# Patient Record
Sex: MALE | Race: WHITE | NOT HISPANIC OR LATINO | URBAN - METROPOLITAN AREA
[De-identification: names, ages, dates, MRNs, and addresses within clinical notes are randomized per-mention and may not be internally consistent; named-entity substitution may affect disease eponyms.]

---

## 2020-01-30 ENCOUNTER — EMERGENCY (EMERGENCY)
Facility: HOSPITAL | Age: 60
LOS: 1 days | Discharge: HOME | End: 2020-01-30
Attending: EMERGENCY MEDICINE
Payer: COMMERCIAL

## 2020-01-30 VITALS
TEMPERATURE: 98 F | SYSTOLIC BLOOD PRESSURE: 164 MMHG | RESPIRATION RATE: 20 BRPM | WEIGHT: 166.01 LBS | HEIGHT: 66 IN | DIASTOLIC BLOOD PRESSURE: 94 MMHG | OXYGEN SATURATION: 99 % | HEART RATE: 127 BPM

## 2020-01-30 PROCEDURE — 99283 EMERGENCY DEPT VISIT LOW MDM: CPT

## 2020-01-31 VITALS
SYSTOLIC BLOOD PRESSURE: 120 MMHG | RESPIRATION RATE: 18 BRPM | DIASTOLIC BLOOD PRESSURE: 65 MMHG | TEMPERATURE: 98 F | HEART RATE: 87 BPM | OXYGEN SATURATION: 98 %

## 2020-01-31 LAB
APPEARANCE UR: CLEAR — SIGNIFICANT CHANGE UP
BACTERIA # UR AUTO: ABNORMAL /HPF
BILIRUB UR-MCNC: NEGATIVE — SIGNIFICANT CHANGE UP
COLOR SPEC: ABNORMAL
DIFF PNL FLD: ABNORMAL
EPI CELLS # UR: SIGNIFICANT CHANGE UP /HPF
GLUCOSE UR QL: NEGATIVE — SIGNIFICANT CHANGE UP
HYALINE CASTS # UR AUTO: ABNORMAL /LPF
KETONES UR-MCNC: NEGATIVE — SIGNIFICANT CHANGE UP
LEUKOCYTE ESTERASE UR-ACNC: ABNORMAL
NITRITE UR-MCNC: NEGATIVE — SIGNIFICANT CHANGE UP
PH UR: 7 — SIGNIFICANT CHANGE UP (ref 5–8)
PROT UR-MCNC: 100
RBC CASTS # UR COMP ASSIST: ABNORMAL /HPF (ref 0–2)
SP GR SPEC: 1 — LOW (ref 1.01–1.02)
UROBILINOGEN FLD QL: NEGATIVE — SIGNIFICANT CHANGE UP
WBC UR QL: SIGNIFICANT CHANGE UP /HPF (ref 0–5)

## 2020-01-31 PROCEDURE — 81001 URINALYSIS AUTO W/SCOPE: CPT

## 2020-01-31 PROCEDURE — 99283 EMERGENCY DEPT VISIT LOW MDM: CPT | Mod: 25

## 2020-01-31 PROCEDURE — 51702 INSERT TEMP BLADDER CATH: CPT

## 2020-01-31 PROCEDURE — 87086 URINE CULTURE/COLONY COUNT: CPT

## 2020-01-31 RX ORDER — TAMSULOSIN HYDROCHLORIDE 0.4 MG/1
1 CAPSULE ORAL
Qty: 14 | Refills: 0
Start: 2020-01-31 | End: 2020-02-13

## 2020-01-31 NOTE — ED ADULT NURSE NOTE - NSIMPLEMENTINTERV_GEN_ALL_ED
Implemented All Universal Safety Interventions:  [place] to call system. Call bell, personal items and telephone within reach. Instruct patient to call for assistance. Room bathroom lighting operational. Non-slip footwear when patient is off stretcher. Physically safe environment: no spills, clutter or unnecessary equipment. Stretcher in lowest position, wheels locked, appropriate side rails in place.

## 2020-01-31 NOTE — ED PROVIDER NOTE - PHYSICAL EXAMINATION
GENERAL: well appearing, uncomfortable standing up next to stretcher    HEAD: atraumatic   EYES: EOMI, pink conjunctiva   ENT: moist oral mucosa   CARDIAC: RRR, no edema, distal pulses present   RESPIRATORY: lungs CTAB, no increased work of breathing   GASTROINTESTINAL: +lower abd fullness; no abdominal tenderness, no rebound or guarding, bowel sounds presents  GENITOURINARY: no CVA tenderness   MUSCULOSKELETAL: no deformity   NEUROLOGICAL: AAOx3, spontaneous movement of extremities   SKIN: intact   PSYCHIATRIC: cooperative  HEME LYMPH: no lymphadenopathy

## 2020-01-31 NOTE — ED PROVIDER NOTE - CLINICAL SUMMARY MEDICAL DECISION MAKING FREE TEXT BOX
60 yo M with urinary retention. Hui cath placed by RN, not by me. Symptoms resolved. Will dc with rx for flomax and pt has private urology fu. Discussed indications for patient return to ED. Patient understood.

## 2020-01-31 NOTE — ED PROVIDER NOTE - NS ED ROS FT
CONSTITUTIONAL: no fever, no chills   EYES: no visual changes, no eye pain   ENMT: no nasal congestion, no throat pain  CARDIOVASCULAR: no chest pain, no edema, no palpitations   RESPIRATORY: no shortness of breath, no cough   GASTROINTESTINAL: no abdominal pain, no nausea, no vomiting, no diarrhea, no constipation   GENITOURINARY: no dysuria, no frequency, +retention   MUSCULOSKELETAL: no joint pains, no myalgias, no back pain   SKIN: no rashes  NEUROLOGICAL: no weakness, no headache, no dizziness, no slurred speech, no syncope   PSYCHIATRIC: no known mental health illness   HEME/LYMPH: no lymphadenopathy      All other ROS negative except as per HPI

## 2020-01-31 NOTE — ED PROVIDER NOTE - NSFOLLOWUPCLINICS_GEN_ALL_ED_FT
[facility]  Urology  [address] [place] [address]  Phone: [phone]  Fax: [phone]  Follow Up Time:

## 2020-01-31 NOTE — ED PROVIDER NOTE - NSFOLLOWUPINSTRUCTIONS_ED_ALL_ED_FT
Acute Urinary Retention, Male     Acute urinary retention is a condition in which a person is unable to pass urine. This can last for a short time or for a long time. If left untreated, it can result in kidney damage or other serious complications.  What are the causes?  This condition may be caused by:  Obstruction or narrowing of the tube that drains the bladder (urethra). This may be caused by surgery or problems with nearby organs, such as the prostate gland, which can press or squeeze the urethra.Problems with the nerves in the bladder. These can be caused by diseases, such as multiple sclerosis, or by spinal cord injuries.Certain medicines.Tumors in the area of the pelvis, bladder, or urethra.Diabetes.Degenerative cognitive conditions such as delirium or dementia.Bladder or urinary tract infection.Constipation.Blood in the urine (hematuria).Injury to the bladder or urethra. Psychological (psychogenic) conditions. Someone may hold his urine due to trauma or because he does not want to use the bathroom.What increases the risk?  This condition is more likely to develop in older men. As men age, their prostate may become larger and may start pressing or squeezing on the bladder or the urethra.  What are the signs or symptoms?  Symptoms of this condition include:  Trouble urinating.Pain in the lower abdomen.Symptoms usually come on slowly over a long period of time.  How is this diagnosed?  This condition is diagnosed based on a physical exam and a medical history. You may also have other tests, including:  An ultrasound of the bladder or kidneys or both.Blood tests.A urine analysis.Additional tests may be needed such as an MRI, kidney, or bladder function tests.How is this treated?  Treatment for this condition may include:  Medicines.Placing a thin, sterile tube (catheter) into the bladder to drain urine out of the body. This is called an indwelling urinary catheter. After being inserted, the catheter is held in place with a small balloon that is filled with sterile water. Urine drains from the catheter into a collection bag outside of the body.Behavioral therapy.Treatment for any underlying conditions.If needed, you may be treated in the hospital for kidney function problems or to manage other complications.Follow these instructions at home:  Take over-the-counter and prescription medicines only as told by your health care provider. Avoid certain medicines, such as decongestants, antihistamines, and some prescription medicines. Do not take any medicine unless your health care provider has approved.If you were given an indwelling urinary catheter, take care of it as told by your health care provider.Drink enough fluid to keep your urine clear or pale yellow.If you were prescribed an antibiotic, take it as told by your health care provider. Do not stop taking the antibiotic even if you start to feel better.Do not use any products that contain nicotine or tobacco, such as cigarettes and e-cigarettes. If you need help quitting, ask your health care provider.Monitor any changes in your symptoms. Tell your health care provider about any changes.If instructed, monitor your blood pressure at home. Report changes as told by your health care provider.Keep all follow-up visits as told by your health care provider. This is important.Contact a health care provider if:  You have uncomfortable bladder contractions that you cannot control (spasms) or you leak urine with the spasms.Get help right away if:  You have chills or fever.You have blood in your urine.You have a catheter and:  Your catheter stops draining urine.Your catheter falls out.Summary  Acute urinary retention is a condition in which a person is unable to pass urine. If left untreated, it can result in kidney damage or other serious complications.The cause of this condition may include an enlarged prostate. As men age, their prostate gland may become larger and may start pressing or squeezing on the bladder or the urethra.Treatment for this condition may include medicines and placement of an indwelling urinary catheter.Monitor any changes in your symptoms. Tell your health care provider about any changes.This information is not intended to replace advice given to you by your health care provider. Make sure you discuss any questions you have with your health care provider.    Document Released: 03/26/2002 Document Revised: 01/19/2018 Document Reviewed: 01/19/2018  [employer] Interactive Patient Education © 2019 [employer]

## 2020-01-31 NOTE — ED ADULT NURSE NOTE - OBJECTIVE STATEMENT
Pt presented to the ed with c/o urinary rentention.  Pt stated that he could not urinate and felt discomfort but not pain.

## 2020-01-31 NOTE — ED PROVIDER NOTE - PATIENT PORTAL LINK FT
You can access the FollowMyHealth Patient Portal offered by [facility] by registering at the following website: [email or web address]. By joining [employer]’s FollowMyHealth portal, you will also be able to view your health information using other applications (apps) compatible with our system.

## 2020-02-01 LAB
CULTURE RESULTS: NO GROWTH — SIGNIFICANT CHANGE UP
SPECIMEN SOURCE: SIGNIFICANT CHANGE UP

## 2020-02-06 ENCOUNTER — APPOINTMENT (OUTPATIENT)
Dept: CT IMAGING | Facility: CLINIC | Age: 60
End: 2020-02-06
Payer: COMMERCIAL

## 2020-02-06 ENCOUNTER — OUTPATIENT (OUTPATIENT)
Dept: OUTPATIENT SERVICES | Facility: HOSPITAL | Age: 60
LOS: 1 days | End: 2020-02-06
Payer: COMMERCIAL

## 2020-02-06 DIAGNOSIS — Z00.8 ENCOUNTER FOR OTHER GENERAL EXAMINATION: ICD-10-CM

## 2020-02-06 PROCEDURE — 74176 CT ABD & PELVIS W/O CONTRAST: CPT

## 2020-02-06 PROCEDURE — 74176 CT ABD & PELVIS W/O CONTRAST: CPT | Mod: 26

## 2020-02-21 ENCOUNTER — EMERGENCY (EMERGENCY)
Facility: HOSPITAL | Age: 60
LOS: 1 days | Discharge: HOME | End: 2020-02-21
Attending: EMERGENCY MEDICINE
Payer: COMMERCIAL

## 2020-02-21 VITALS
TEMPERATURE: 98 F | DIASTOLIC BLOOD PRESSURE: 79 MMHG | HEART RATE: 58 BPM | RESPIRATION RATE: 18 BRPM | SYSTOLIC BLOOD PRESSURE: 169 MMHG | OXYGEN SATURATION: 97 %

## 2020-02-21 VITALS
OXYGEN SATURATION: 97 % | DIASTOLIC BLOOD PRESSURE: 83 MMHG | HEART RATE: 92 BPM | RESPIRATION RATE: 19 BRPM | TEMPERATURE: 98 F | SYSTOLIC BLOOD PRESSURE: 155 MMHG | WEIGHT: 160.06 LBS | HEIGHT: 66 IN

## 2020-02-21 LAB
ALBUMIN SERPL ELPH-MCNC: 5 G/DL — SIGNIFICANT CHANGE UP (ref 3.3–5)
ALP SERPL-CCNC: 88 U/L — SIGNIFICANT CHANGE UP (ref 40–120)
ALT FLD-CCNC: 36 U/L — SIGNIFICANT CHANGE UP (ref 10–45)
ANION GAP SERPL CALC-SCNC: 17 MMOL/L — SIGNIFICANT CHANGE UP (ref 5–17)
APPEARANCE UR: CLEAR — SIGNIFICANT CHANGE UP
AST SERPL-CCNC: 27 U/L — SIGNIFICANT CHANGE UP (ref 10–40)
BASOPHILS # BLD AUTO: 0.03 K/UL — SIGNIFICANT CHANGE UP (ref 0–0.2)
BASOPHILS NFR BLD AUTO: 0.4 % — SIGNIFICANT CHANGE UP (ref 0–2)
BILIRUB SERPL-MCNC: 0.6 MG/DL — SIGNIFICANT CHANGE UP (ref 0.2–1.2)
BILIRUB UR-MCNC: NEGATIVE — SIGNIFICANT CHANGE UP
BUN SERPL-MCNC: 19 MG/DL — SIGNIFICANT CHANGE UP (ref 7–23)
CALCIUM SERPL-MCNC: 9.5 MG/DL — SIGNIFICANT CHANGE UP (ref 8.4–10.5)
CHLORIDE SERPL-SCNC: 103 MMOL/L — SIGNIFICANT CHANGE UP (ref 96–108)
CO2 SERPL-SCNC: 21 MMOL/L — LOW (ref 22–31)
COLOR SPEC: YELLOW — SIGNIFICANT CHANGE UP
CREAT SERPL-MCNC: 0.67 MG/DL — SIGNIFICANT CHANGE UP (ref 0.5–1.3)
DIFF PNL FLD: ABNORMAL
EOSINOPHIL # BLD AUTO: 0.1 K/UL — SIGNIFICANT CHANGE UP (ref 0–0.5)
EOSINOPHIL NFR BLD AUTO: 1.2 % — SIGNIFICANT CHANGE UP (ref 0–6)
GLUCOSE SERPL-MCNC: 104 MG/DL — HIGH (ref 70–99)
GLUCOSE UR QL: NEGATIVE — SIGNIFICANT CHANGE UP
HCT VFR BLD CALC: 47.9 % — SIGNIFICANT CHANGE UP (ref 39–50)
HGB BLD-MCNC: 15.7 G/DL — SIGNIFICANT CHANGE UP (ref 13–17)
IMM GRANULOCYTES NFR BLD AUTO: 0.5 % — SIGNIFICANT CHANGE UP (ref 0–1.5)
KETONES UR-MCNC: NEGATIVE — SIGNIFICANT CHANGE UP
LEUKOCYTE ESTERASE UR-ACNC: ABNORMAL
LYMPHOCYTES # BLD AUTO: 1.31 K/UL — SIGNIFICANT CHANGE UP (ref 1–3.3)
LYMPHOCYTES # BLD AUTO: 15.7 % — SIGNIFICANT CHANGE UP (ref 13–44)
MCHC RBC-ENTMCNC: 31.2 PG — SIGNIFICANT CHANGE UP (ref 27–34)
MCHC RBC-ENTMCNC: 32.8 GM/DL — SIGNIFICANT CHANGE UP (ref 32–36)
MCV RBC AUTO: 95.2 FL — SIGNIFICANT CHANGE UP (ref 80–100)
MONOCYTES # BLD AUTO: 0.49 K/UL — SIGNIFICANT CHANGE UP (ref 0–0.9)
MONOCYTES NFR BLD AUTO: 5.9 % — SIGNIFICANT CHANGE UP (ref 2–14)
NEUTROPHILS # BLD AUTO: 6.39 K/UL — SIGNIFICANT CHANGE UP (ref 1.8–7.4)
NEUTROPHILS NFR BLD AUTO: 76.3 % — SIGNIFICANT CHANGE UP (ref 43–77)
NITRITE UR-MCNC: NEGATIVE — SIGNIFICANT CHANGE UP
NRBC # BLD: 0 /100 WBCS — SIGNIFICANT CHANGE UP (ref 0–0)
PH UR: 6 — SIGNIFICANT CHANGE UP (ref 5–8)
PLATELET # BLD AUTO: 180 K/UL — SIGNIFICANT CHANGE UP (ref 150–400)
POTASSIUM SERPL-MCNC: 4.1 MMOL/L — SIGNIFICANT CHANGE UP (ref 3.5–5.3)
POTASSIUM SERPL-SCNC: 4.1 MMOL/L — SIGNIFICANT CHANGE UP (ref 3.5–5.3)
PROT SERPL-MCNC: 8.2 G/DL — SIGNIFICANT CHANGE UP (ref 6–8.3)
PROT UR-MCNC: SIGNIFICANT CHANGE UP
RBC # BLD: 5.03 M/UL — SIGNIFICANT CHANGE UP (ref 4.2–5.8)
RBC # FLD: 12.1 % — SIGNIFICANT CHANGE UP (ref 10.3–14.5)
SODIUM SERPL-SCNC: 141 MMOL/L — SIGNIFICANT CHANGE UP (ref 135–145)
SP GR SPEC: 1.01 — SIGNIFICANT CHANGE UP (ref 1.01–1.02)
UROBILINOGEN FLD QL: NEGATIVE — SIGNIFICANT CHANGE UP
WBC # BLD: 8.36 K/UL — SIGNIFICANT CHANGE UP (ref 3.8–10.5)
WBC # FLD AUTO: 8.36 K/UL — SIGNIFICANT CHANGE UP (ref 3.8–10.5)

## 2020-02-21 PROCEDURE — 87186 SC STD MICRODIL/AGAR DIL: CPT

## 2020-02-21 PROCEDURE — 99284 EMERGENCY DEPT VISIT MOD MDM: CPT

## 2020-02-21 PROCEDURE — 76770 US EXAM ABDO BACK WALL COMP: CPT

## 2020-02-21 PROCEDURE — 99284 EMERGENCY DEPT VISIT MOD MDM: CPT | Mod: 25

## 2020-02-21 PROCEDURE — 99053 MED SERV 10PM-8AM 24 HR FAC: CPT

## 2020-02-21 PROCEDURE — 81001 URINALYSIS AUTO W/SCOPE: CPT

## 2020-02-21 PROCEDURE — 85027 COMPLETE CBC AUTOMATED: CPT

## 2020-02-21 PROCEDURE — 87086 URINE CULTURE/COLONY COUNT: CPT

## 2020-02-21 PROCEDURE — 80053 COMPREHEN METABOLIC PANEL: CPT

## 2020-02-21 PROCEDURE — 76770 US EXAM ABDO BACK WALL COMP: CPT | Mod: 26

## 2020-02-21 RX ORDER — CIPROFLOXACIN LACTATE 400MG/40ML
500 VIAL (ML) INTRAVENOUS ONCE
Refills: 0 | Status: COMPLETED | OUTPATIENT
Start: 2020-02-21 | End: 2020-02-21

## 2020-02-21 RX ORDER — CIPROFLOXACIN LACTATE 400MG/40ML
1 VIAL (ML) INTRAVENOUS
Qty: 14 | Refills: 0
Start: 2020-02-21 | End: 2020-02-27

## 2020-02-21 RX ADMIN — Medication 500 MILLIGRAM(S): at 11:18

## 2020-02-21 NOTE — ED PROVIDER NOTE - ATTENDING CONTRIBUTION TO CARE
Pt with 3 complaints, one acute.  Acute complaint -- RIGHT flank pain, dull, feels like pressure, felt this before, also feels it on left side but not as bad, feels worse this morning.  +reproducible point tenderness R flank subcostal.  Other 2 complaints are weeks to months of burning with urination and suprapubic pressure.  Had recent negative UA and +urinary retention s/p ramirez that is removed and now on Flomax.  +UOP, less this morning.      Will check labs, UA, and U/S to r/o hydronephrosis and r/o bladder urinary retention.  Otherwise, suspect patient with more MSK as the stone is intrarenal and chronic and not causing his pain, and patient had recent negative workup for urinary infection.

## 2020-02-21 NOTE — ED PROVIDER NOTE - PATIENT PORTAL LINK FT
You can access the FollowMyHealth Patient Portal offered by [facility] by registering at the following website: [email or web address]. By joining [employer]’s FollowMyHealth portal, you will also be able to view your health information using other applications (apps) compatible with our system.

## 2020-02-21 NOTE — ED PROVIDER NOTE - CLINICAL SUMMARY MEDICAL DECISION MAKING FREE TEXT BOX
59M w/ HTN p/w dysuria, check renal function, UA, VSS doubt sepsis, pain seems to be mild, doubt stone

## 2020-02-21 NOTE — ED PROVIDER NOTE - NSFOLLOWUPINSTRUCTIONS_ED_ALL_ED_FT
Pyelonephritis    Pyelonephritis is a kidney infection. In most cases, the infection clears up with treatment and does not cause further problems. More severe infections or chronic infections can sometimes spread to the bloodstream or lead to other problems with the kidneys. Symptoms include frequent or painful urination, abdominal pain, back pain, flank pain, fever/chills, nausea, or vomiting. If you were prescribed an antibiotic medicine, take it as told by your health care provider. Do not stop taking the antibiotic even if you start to feel better.    SEEK IMMEDIATE MEDICAL CARE IF YOU HAVE ANY OF THE FOLLOWING SYMPTOMS: inability to hold down antibiotics or fluids, worsening pain, dizziness/lightheadedness, or change in mental status.    - Follow up with your primary care doctor in 1-2 days.    - Bring results with you to the appointment.   - Take tylenol 650mg or motrin 600mg every 6 hours for pain or fever.   - Return to the ED for new or worsening symptoms.

## 2020-02-21 NOTE — ED PROVIDER NOTE - NS ED ROS FT
General: denies fever, chills  HENT: denies nasal congestion, rhinorrhea  CV: denies chest pain, palpitations  Resp: denies difficulty breathing, cough  Abdominal: denies nausea, vomiting, + flank pain  : + dysuria    MSK: denies muscle aches, leg swelling  Neuro: denies headaches, numbness, tingling  Skin: denies rashes, bruises

## 2020-02-21 NOTE — ED PROVIDER NOTE - OBJECTIVE STATEMENT
59M w/ HTN, recent episode of urinary retention p/w bilateral flank soreness and dysuria. States his symptoms started to improve after being treated for UTI over the past week but worsened yesterday. Went to uro office yesterday and was started on pyridium. Denies fevers, chills, n/v. States he has a h/o stones but this feels different. Patient w/ negative culture during last visit

## 2020-02-21 NOTE — ED ADULT NURSE NOTE - OBJECTIVE STATEMENT
59 male c/o urinary pain/"kidney pain" this morning. Seen outpatient recently for kidney stones, Hui placed and removed in that time. Now urinating with no issues, but c/o some burning in his urethra. No back pain at present, denies blood in the urine. No fever/chills, no CP, SOB. pt. is well appearing, ambulatory, urine is dark yellow and urinated here with no difficulty. states pain was worse this morning but felt like pain when he had previous stones.

## 2020-02-24 NOTE — ED POST DISCHARGE NOTE - DETAILS
patient sent on cipro which is resistant, will require a change in abx. lvm to call back admin line - Sameera Winters PA-C spoke with patient, changed to augmentin, went over return precautions - Sameera Winters PA-C

## 2020-02-29 PROBLEM — I10 ESSENTIAL (PRIMARY) HYPERTENSION: Chronic | Status: ACTIVE | Noted: 2020-02-21

## 2020-03-05 ENCOUNTER — OUTPATIENT (OUTPATIENT)
Dept: OUTPATIENT SERVICES | Facility: HOSPITAL | Age: 60
LOS: 1 days | End: 2020-03-05
Payer: COMMERCIAL

## 2020-03-05 VITALS
WEIGHT: 156.09 LBS | DIASTOLIC BLOOD PRESSURE: 80 MMHG | RESPIRATION RATE: 16 BRPM | TEMPERATURE: 98 F | SYSTOLIC BLOOD PRESSURE: 145 MMHG | HEART RATE: 62 BPM | OXYGEN SATURATION: 98 % | HEIGHT: 66 IN

## 2020-03-05 DIAGNOSIS — N21.0 CALCULUS IN BLADDER: ICD-10-CM

## 2020-03-05 DIAGNOSIS — Z01.818 ENCOUNTER FOR OTHER PREPROCEDURAL EXAMINATION: ICD-10-CM

## 2020-03-05 DIAGNOSIS — I10 ESSENTIAL (PRIMARY) HYPERTENSION: ICD-10-CM

## 2020-03-05 DIAGNOSIS — Z87.81 PERSONAL HISTORY OF (HEALED) TRAUMATIC FRACTURE: Chronic | ICD-10-CM

## 2020-03-05 LAB
ANION GAP SERPL CALC-SCNC: 16 MMOL/L — SIGNIFICANT CHANGE UP (ref 5–17)
BUN SERPL-MCNC: 13 MG/DL — SIGNIFICANT CHANGE UP (ref 7–23)
CALCIUM SERPL-MCNC: 9.2 MG/DL — SIGNIFICANT CHANGE UP (ref 8.4–10.5)
CHLORIDE SERPL-SCNC: 103 MMOL/L — SIGNIFICANT CHANGE UP (ref 96–108)
CO2 SERPL-SCNC: 22 MMOL/L — SIGNIFICANT CHANGE UP (ref 22–31)
CREAT SERPL-MCNC: 0.7 MG/DL — SIGNIFICANT CHANGE UP (ref 0.5–1.3)
GLUCOSE SERPL-MCNC: 102 MG/DL — HIGH (ref 70–99)
HCT VFR BLD CALC: 43.7 % — SIGNIFICANT CHANGE UP (ref 39–50)
HGB BLD-MCNC: 15 G/DL — SIGNIFICANT CHANGE UP (ref 13–17)
MCHC RBC-ENTMCNC: 32.3 PG — SIGNIFICANT CHANGE UP (ref 27–34)
MCHC RBC-ENTMCNC: 34.3 GM/DL — SIGNIFICANT CHANGE UP (ref 32–36)
MCV RBC AUTO: 94.2 FL — SIGNIFICANT CHANGE UP (ref 80–100)
NRBC # BLD: 0 /100 WBCS — SIGNIFICANT CHANGE UP (ref 0–0)
PLATELET # BLD AUTO: 172 K/UL — SIGNIFICANT CHANGE UP (ref 150–400)
POTASSIUM SERPL-MCNC: 3.3 MMOL/L — LOW (ref 3.5–5.3)
POTASSIUM SERPL-SCNC: 3.3 MMOL/L — LOW (ref 3.5–5.3)
RBC # BLD: 4.64 M/UL — SIGNIFICANT CHANGE UP (ref 4.2–5.8)
RBC # FLD: 12.5 % — SIGNIFICANT CHANGE UP (ref 10.3–14.5)
SODIUM SERPL-SCNC: 141 MMOL/L — SIGNIFICANT CHANGE UP (ref 135–145)
WBC # BLD: 5.63 K/UL — SIGNIFICANT CHANGE UP (ref 3.8–10.5)
WBC # FLD AUTO: 5.63 K/UL — SIGNIFICANT CHANGE UP (ref 3.8–10.5)

## 2020-03-05 PROCEDURE — 80048 BASIC METABOLIC PNL TOTAL CA: CPT

## 2020-03-05 PROCEDURE — G0463: CPT

## 2020-03-05 PROCEDURE — 85027 COMPLETE CBC AUTOMATED: CPT

## 2020-03-05 PROCEDURE — 87086 URINE CULTURE/COLONY COUNT: CPT

## 2020-03-05 RX ORDER — SODIUM CHLORIDE 9 MG/ML
3 INJECTION INTRAMUSCULAR; INTRAVENOUS; SUBCUTANEOUS EVERY 8 HOURS
Refills: 0 | Status: DISCONTINUED | OUTPATIENT
Start: 2020-03-12 | End: 2020-03-12

## 2020-03-05 RX ORDER — CEFAZOLIN SODIUM 1 G
2000 VIAL (EA) INJECTION ONCE
Refills: 0 | Status: DISCONTINUED | OUTPATIENT
Start: 2020-03-12 | End: 2020-03-27

## 2020-03-05 RX ORDER — LIDOCAINE HCL 20 MG/ML
0.2 VIAL (ML) INJECTION ONCE
Refills: 0 | Status: DISCONTINUED | OUTPATIENT
Start: 2020-03-12 | End: 2020-03-12
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
## 2020-03-05 NOTE — H&P PST ADULT - HISTORY OF PRESENT ILLNESS
58 yo M with h/o HTN, BPH. renal calculi- 60 yo M with h/o HTN, BPH. renal calculi-c/o dysuria, urinary retention & lower abdominal pain x 6 weeks. Pt had Ed admission-Hui cath insertion, renal CT scan revealed calculus in bladder. Pt had urology evaluation- scheduled for cystoscopy, laser of bladder stones on 03/12/2020. Pt denies an fever, chills or hematuria @ present 60 yo M with h/o HTN, BPH. renal calculi-c/o dysuria, urinary retention & lower abdominal pain x 6 weeks. Pt had Ed admission-Hui cath insertion, renal CT scan revealed calculus in bladder. Pt had urology evaluation- scheduled for cystoscopy, laser of bladder stones on 03/12/2020. Pt on Augmentin @ present denies an fever, chills or hematuria.

## 2020-03-05 NOTE — H&P PST ADULT - NSICDXPASTSURGICALHX_GEN_ALL_CORE_FT
PAST SURGICAL HISTORY:  H/O fracture of right hip ORIF right [id number] PAST SURGICAL HISTORY:  H/O fracture of right hip ORIF right [id number]

## 2020-03-05 NOTE — H&P PST ADULT - NSICDXPROBLEM_GEN_ALL_CORE_FT
PROBLEM DIAGNOSES  Problem: Calculus in bladder  Assessment and Plan: cystoscopy, laser of bladder stones  labs- CBC, BMP, urine C&S  pre op instructions discussed    Problem: Benign hypertension  Assessment and Plan: continue with meds

## 2020-03-06 LAB
CULTURE RESULTS: NO GROWTH — SIGNIFICANT CHANGE UP
SPECIMEN SOURCE: SIGNIFICANT CHANGE UP

## 2020-03-11 ENCOUNTER — TRANSCRIPTION ENCOUNTER (OUTPATIENT)
Age: 60
End: 2020-03-11

## 2020-03-12 ENCOUNTER — OUTPATIENT (OUTPATIENT)
Dept: OUTPATIENT SERVICES | Facility: HOSPITAL | Age: 60
LOS: 1 days | End: 2020-03-12
Payer: COMMERCIAL

## 2020-03-12 ENCOUNTER — RESULT REVIEW (OUTPATIENT)
Age: 60
End: 2020-03-12

## 2020-03-12 VITALS
WEIGHT: 156.09 LBS | SYSTOLIC BLOOD PRESSURE: 145 MMHG | HEART RATE: 89 BPM | HEIGHT: 66 IN | OXYGEN SATURATION: 97 % | DIASTOLIC BLOOD PRESSURE: 81 MMHG | TEMPERATURE: 98 F | RESPIRATION RATE: 18 BRPM

## 2020-03-12 VITALS
DIASTOLIC BLOOD PRESSURE: 71 MMHG | SYSTOLIC BLOOD PRESSURE: 131 MMHG | RESPIRATION RATE: 16 BRPM | HEART RATE: 77 BPM | OXYGEN SATURATION: 96 %

## 2020-03-12 DIAGNOSIS — N21.0 CALCULUS IN BLADDER: ICD-10-CM

## 2020-03-12 DIAGNOSIS — Z87.81 PERSONAL HISTORY OF (HEALED) TRAUMATIC FRACTURE: Chronic | ICD-10-CM

## 2020-03-12 PROCEDURE — 88300 SURGICAL PATH GROSS: CPT | Mod: 26

## 2020-03-12 RX ORDER — SODIUM CHLORIDE 9 MG/ML
1000 INJECTION, SOLUTION INTRAVENOUS
Refills: 0 | Status: DISCONTINUED | OUTPATIENT
Start: 2020-03-12 | End: 2020-03-27

## 2020-03-12 RX ORDER — HYDROMORPHONE HYDROCHLORIDE 2 MG/ML
0.5 INJECTION INTRAMUSCULAR; INTRAVENOUS; SUBCUTANEOUS
Refills: 0 | Status: DISCONTINUED | OUTPATIENT
Start: 2020-03-12 | End: 2020-03-13

## 2020-03-12 RX ORDER — ONDANSETRON 8 MG/1
4 TABLET, FILM COATED ORAL ONCE
Refills: 0 | Status: COMPLETED | OUTPATIENT
Start: 2020-03-12 | End: 2020-03-12

## 2020-03-12 RX ADMIN — HYDROMORPHONE HYDROCHLORIDE 0.5 MILLIGRAM(S): 2 INJECTION INTRAMUSCULAR; INTRAVENOUS; SUBCUTANEOUS at 18:16

## 2020-03-12 RX ADMIN — HYDROMORPHONE HYDROCHLORIDE 0.5 MILLIGRAM(S): 2 INJECTION INTRAMUSCULAR; INTRAVENOUS; SUBCUTANEOUS at 19:08

## 2020-03-12 RX ADMIN — ONDANSETRON 4 MILLIGRAM(S): 8 TABLET, FILM COATED ORAL at 21:14

## 2020-03-12 RX ADMIN — HYDROMORPHONE HYDROCHLORIDE 0.5 MILLIGRAM(S): 2 INJECTION INTRAMUSCULAR; INTRAVENOUS; SUBCUTANEOUS at 19:07

## 2020-03-12 RX ADMIN — ONDANSETRON 4 MILLIGRAM(S): 8 TABLET, FILM COATED ORAL at 19:11

## 2020-03-12 RX ADMIN — HYDROMORPHONE HYDROCHLORIDE 0.5 MILLIGRAM(S): 2 INJECTION INTRAMUSCULAR; INTRAVENOUS; SUBCUTANEOUS at 18:30

## 2020-03-12 NOTE — ASU DISCHARGE PLAN (ADULT/PEDIATRIC) - FOLLOW UP APPOINTMENTS
[facility] ([facility]): 911 or go to the nearest Emergency Room

## 2020-03-12 NOTE — ASU PREOP CHECKLIST - VERIFY SURGICAL SITE/SIDE WITH PATIENT
Tolerated procedure well  Transported to Phase 2 via cart  Handoff report given to Radha TRUJILLO  Left sided lower back dressing dry and intact  Patient with no complaints - sleepy but conversing with staff  4 hr bedrest- until [id number]     done

## 2020-03-12 NOTE — ASU DISCHARGE PLAN (ADULT/PEDIATRIC) - CARE PROVIDER_API CALL
Goldberg, Gary D D (MD)  Urology  [address] [place] [address], [address]  [place] [address]  Phone: [phone]  Fax: [phone]  Follow Up Time:

## 2020-03-13 ENCOUNTER — EMERGENCY (EMERGENCY)
Facility: HOSPITAL | Age: 60
LOS: 0 days | Discharge: HOME | End: 2020-03-13
Attending: EMERGENCY MEDICINE
Payer: COMMERCIAL

## 2020-03-13 VITALS
TEMPERATURE: 97 F | DIASTOLIC BLOOD PRESSURE: 86 MMHG | SYSTOLIC BLOOD PRESSURE: 139 MMHG | OXYGEN SATURATION: 94 % | HEIGHT: 66 IN | WEIGHT: 158.07 LBS | RESPIRATION RATE: 18 BRPM | HEART RATE: 94 BPM

## 2020-03-13 DIAGNOSIS — Z87.81 PERSONAL HISTORY OF (HEALED) TRAUMATIC FRACTURE: Chronic | ICD-10-CM

## 2020-03-13 DIAGNOSIS — I10 ESSENTIAL (PRIMARY) HYPERTENSION: ICD-10-CM

## 2020-03-13 DIAGNOSIS — T83.091A OTHER MECHANICAL COMPLICATION OF INDWELLING URETHRAL CATHETER, INITIAL ENCOUNTER: ICD-10-CM
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
## 2020-03-13 DIAGNOSIS — Z87.442 PERSONAL HISTORY OF URINARY CALCULI: ICD-10-CM

## 2020-03-13 DIAGNOSIS — N40.1 BENIGN PROSTATIC HYPERPLASIA WITH LOWER URINARY TRACT SYMPTOMS: ICD-10-CM
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
## 2020-03-13 DIAGNOSIS — N13.8 OTHER OBSTRUCTIVE AND REFLUX UROPATHY: ICD-10-CM

## 2020-03-13 PROBLEM — Z87.19 PERSONAL HISTORY OF OTHER DISEASES OF THE DIGESTIVE SYSTEM: Chronic | Status: ACTIVE | Noted: 2020-03-05

## 2020-03-13 PROBLEM — N20.0 CALCULUS OF KIDNEY: Chronic | Status: ACTIVE | Noted: 2020-03-05
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
## 2020-03-13 PROCEDURE — 99282 EMERGENCY DEPT VISIT SF MDM: CPT

## 2020-03-13 PROCEDURE — 82365 CALCULUS SPECTROSCOPY: CPT

## 2020-03-13 PROCEDURE — C1889: CPT

## 2020-03-13 PROCEDURE — 88300 SURGICAL PATH GROSS: CPT

## 2020-03-13 PROCEDURE — 99283 EMERGENCY DEPT VISIT LOW MDM: CPT

## 2020-03-13 PROCEDURE — 52318 REMOVE BLADDER STONE: CPT

## 2020-03-13 NOTE — ED ADULT TRIAGE NOTE - CHIEF COMPLAINT QUOTE
Pt presents to er with complaints of having blood in his Ramirez catheter, pt states he had a procedure last night and ramirez was placed, states urine is not draining into catheter and is leaking around it with increased pelvic pain/pressure.

## 2020-03-13 NOTE — ED PROVIDER NOTE - OBJECTIVE STATEMENT
59M hx kidney stones s/p lithotripsy yesterday at [facility] by Dr. GOldberg presents to the ED for clogged ramirez. Pt states that last night noticed small clots in ramirez bag and felt like he couldn't urinate -urine started coming around the ramirez out of the tip of the penis. Upon arrival to the ED pt passed a few clots and started urinating on his own. no pain no fevers no nausea or voming.

## 2020-03-13 NOTE — ED PROVIDER NOTE - CLINICAL SUMMARY MEDICAL DECISION MAKING FREE TEXT BOX
59M hx kidney stones s/p lithotripsy yesterday at [facility] by Dr. GOldberg presents to the ED for clogged ramirez. Pt states that last night noticed small clots in ramirez bag and felt like he couldn't urinate -urine started coming around the ramirez out of the tip of the penis. Upon arrival to the ED pt passed a few clots and started urinating on his own. no pain no fevers no nausea or voming. exam non-focal. flushed ramirez with no resistance draining well pink tinged. patient states he spoke with urologist Dr. Goldberg and can follow up. will d/c.  Alfonso Gimenez M.D., Attending Physician

## 2020-03-13 NOTE — ED PROVIDER NOTE - PATIENT PORTAL LINK FT
You can access the FollowMyHealth Patient Portal offered by [facility] by registering at the following website: [email or web address]. By joining [employer]’s FollowMyHealth portal, you will also be able to view your health information using other applications (apps) compatible with our system.

## 2020-03-13 NOTE — ED ADULT NURSE NOTE - OBJECTIVE STATEMENT
pt c/o pelvic pain and urinary retention s/p ramirez placement yesterday. Pt reports that he was leaking around the ramirez. upon examination by RN, pt reports that the urine was draining and he suspects it is no longer blocked. experiencing hematuria and blood clot noted in urine. ramirez emptied by patient. pt in no acute distress. will ctm
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
## 2020-03-13 NOTE — ED PROVIDER NOTE - PHYSICAL EXAMINATION
Constitutional: NAD AAOx3  Eyes: PERRLA EOMI  Head: Normocephalic atraumatic  Mouth: MMM  Cardiac: regular rate   Resp: Lungs CTAB  GI: Abd s/nt/nd  : normal  exam with ramirez  Neuro: CN2-12 intact  Skin: No rashes

## 2020-03-13 NOTE — ED PROVIDER NOTE - NSFOLLOWUPINSTRUCTIONS_ED_ALL_ED_FT
1. return for worsening symptoms or anything concerning to you  2. take all home meds as prescribed  3. follow up with your pmd call to make an appointment  4. follow up with Dr. Goldberg

## 2020-03-13 NOTE — ED PROVIDER NOTE - NS ED ROS FT
Constitutional: No fever or chills  Eyes: No visual changes  HEENT: No throat pain  CV: No chest pain  Resp: No SOB no cough  GI: No abd pain, nausea or vomiting  : No dysuria + hematuria  MSK: No musculoskeletal pain  Skin: No rash  Neuro: No headache

## 2020-03-18 LAB — SURGICAL PATHOLOGY STUDY: SIGNIFICANT CHANGE UP

## 2020-03-19 LAB — NIDUS STONE QN: SIGNIFICANT CHANGE UP

## 2020-05-12 ENCOUNTER — APPOINTMENT (OUTPATIENT)
Dept: ULTRASOUND IMAGING | Facility: IMAGING CENTER | Age: 60
End: 2020-05-12
Payer: COMMERCIAL

## 2020-05-12 ENCOUNTER — OUTPATIENT (OUTPATIENT)
Dept: OUTPATIENT SERVICES | Facility: HOSPITAL | Age: 60
LOS: 1 days | End: 2020-05-12
Payer: COMMERCIAL

## 2020-05-12 ENCOUNTER — RESULT REVIEW (OUTPATIENT)
Age: 60
End: 2020-05-12

## 2020-05-12 DIAGNOSIS — Z00.8 ENCOUNTER FOR OTHER GENERAL EXAMINATION: ICD-10-CM

## 2020-05-12 DIAGNOSIS — Z87.81 PERSONAL HISTORY OF (HEALED) TRAUMATIC FRACTURE: Chronic | ICD-10-CM

## 2020-05-12 PROCEDURE — 76770 US EXAM ABDO BACK WALL COMP: CPT | Mod: 26

## 2020-05-12 PROCEDURE — 76770 US EXAM ABDO BACK WALL COMP: CPT

## 2020-06-29 ENCOUNTER — TRANSCRIPTION ENCOUNTER (OUTPATIENT)
Age: 60
End: 2020-06-29

## 2020-06-30 ENCOUNTER — APPOINTMENT (OUTPATIENT)
Dept: PHYSICAL MEDICINE AND REHAB | Facility: CLINIC | Age: 60
End: 2020-06-30
Payer: COMMERCIAL

## 2020-06-30 VITALS — BODY MASS INDEX: 24.8 KG/M2 | HEIGHT: 67 IN | WEIGHT: 158 LBS

## 2020-06-30 DIAGNOSIS — Z87.442 PERSONAL HISTORY OF URINARY CALCULI: ICD-10-CM

## 2020-06-30 DIAGNOSIS — M51.9 UNSPECIFIED THORACIC, THORACOLUMBAR AND LUMBOSACRAL INTERVERTEBRAL DISC DISORDER: ICD-10-CM

## 2020-06-30 DIAGNOSIS — M54.5 LOW BACK PAIN: ICD-10-CM

## 2020-06-30 DIAGNOSIS — M48.061 SPINAL STENOSIS, LUMBAR REGION WITHOUT NEUROGENIC CLAUDICATION: ICD-10-CM

## 2020-06-30 DIAGNOSIS — N40.0 BENIGN PROSTATIC HYPERPLASIA WITHOUT LOWER URINARY TRACT SYMPMS: ICD-10-CM

## 2020-06-30 PROCEDURE — 99204 OFFICE O/P NEW MOD 45 MIN: CPT

## 2020-07-14 ENCOUNTER — APPOINTMENT (OUTPATIENT)
Dept: PHYSICAL MEDICINE AND REHAB | Facility: CLINIC | Age: 60
End: 2020-07-14

## 2020-07-15 ENCOUNTER — APPOINTMENT (OUTPATIENT)
Dept: PHYSICAL MEDICINE AND REHAB | Facility: CLINIC | Age: 60
End: 2020-07-15
Payer: COMMERCIAL

## 2020-07-15 VITALS
SYSTOLIC BLOOD PRESSURE: 138 MMHG | OXYGEN SATURATION: 96 % | TEMPERATURE: 98.2 F | HEART RATE: 90 BPM | DIASTOLIC BLOOD PRESSURE: 85 MMHG

## 2020-07-15 DIAGNOSIS — M47.817 SPONDYLOSIS W/OUT MYELOPATHY OR RADICULOPATHY, LUMBOSACRAL REGION: ICD-10-CM

## 2020-07-15 PROCEDURE — 99214 OFFICE O/P EST MOD 30 MIN: CPT

## 2020-07-15 NOTE — ASSESSMENT
[FreeTextEntry1] : 60 yo M who presents with low back pain consistent with lumbar spinal stenosis, lumbar disc degeneration, and lumbar radiculopathy.  Pain refractory to more than six weeks of conservative care including PO NSAIDs, steroids, and PT/HEP.  XR lumbar spine also identified non-specific lucency at S3 with recommendation for cross sectional imaging.\par \par -MRI lumbar spine w/o contrast ordered\par -Prednisone 40 mg PO taper prescribed\par -Start methocarbamol as prescribed previously from acute care center\par -Re-start PT/HEP, new referral provided\par -RTC following MRI to review results.\par \par Chema Reddy MD\par Spine and Sports Medicine\par \par [employer]\par At [facility]\par \par

## 2020-07-15 NOTE — PHYSICAL EXAM
[FreeTextEntry1] : Gen: NAD\par HEENT: neck supple\par CV: no cyanosis\par Pulm: breathing well on room air\par Abd: soft\par Low back: range of motion limited by pain, tenderness to palpation left lower lumbar paraspinals, +straight leg raise left, neg FABERE, neg FAIR\par Msk: \par 5/5 hip flexion B/L, 5/5 knee extension B/L, 5/5 knee flexion B/L, 5/5 dorsiflexion B/L, 5/5 plantar flexion B/L\par 5/5 shoulder abduction B/L, 5/5 elbow flexion B/L, 5/5 elbow extension B/L, 5/5 wrist extension B/L, 5/5 hand  B/L\par Neuro: sensation intact to light touch in bilateral upper and lower extremities, reflexes 2+ brachioradialis, biceps, triceps bilaterally, reflexes 2+ patella, medial hamstring, achilles bilaterally, negative babinski, negative morillo\par

## 2020-07-15 NOTE — HISTORY OF PRESENT ILLNESS
[FreeTextEntry1] : 58 yo M with PMH HTN who presents with low back pain.\par \par Onset:  Patient with history of chronic low back pain marked by intermittent flares.  Most recent flare started two months ago.  No inciting events, trauma, or falls.\par Location: right lower lumbar spine\par Characteristics: sharp, dull ache\par Aggravating factors: prolonged sitting\par Alleviating factors: rest\par Radiation: denies\par Treatments: tylenol, NSAIDs, PO steroids with some improvement, physical therapy and HEP without relief\par Severity: 6-8/10\par \par Diagnostic studies:\par XR lumbar spine showing straightening of lumbar lordosis, minimal retrolisthesis of L5 on S1, mild loss of L1-L2 disc height, no fractures or dislocations and a 6 mm non-specific lucency overlying the S3 vertebral body.\par No previous EMG/NCS\par \par Patient denies new weakness, numbness or paresthesia.  Patient denies bowel/bladder dysfunction, fevers, chills, weight loss, night pain, or night sweats.\par

## 2020-07-16 PROBLEM — M47.817 LUMBOSACRAL SPONDYLOSIS WITHOUT MYELOPATHY: Status: ACTIVE | Noted: 2020-07-16

## 2020-07-16 NOTE — PHYSICAL EXAM
[FreeTextEntry1] : General: NAD, alert\par Psych: normal mood and affect\par HEENT: NC/AT, normal visual tracking\par Pulmonary: no resp distress, chest expansion appears symmetrical\par CV: extremities are warm and perfused\par Abd: non-distended\par Ext: no c/c/e\par skin: normal color, appearance, and temperature\par \par Lumbar/Hip Spine:\par Gait - non-antalgic, able to heel and toe walk\par Inspection: normal muscle bulk without asymmetry\par Tenderness to palpation: mild over right lumbar paraspinal, no SIJ tenderness\par ROM: within functional limits - mild pain to right with oblique\par MMT: 5/5 bilateral lower extremities (HF, KE, KF, DF, PF, EHL)\par Reflexes: symmetric bilateral achilles and patella \par Sensory: intact to light touch in all dermatomes of the bilateral lower extremities\par Provocative testing:\par Facet loading - soft pos bilaterally (R>L)\par SLR - negative - tight hamstring\par BART/FADIR - negative \par

## 2020-07-16 NOTE — DATA REVIEWED
[MRI] : MRI [FreeTextEntry1] : [facility] - lumbar MRI\par L5/S1 disc degeneration, no significant stenosis noted.  awaiting on  official report

## 2020-07-16 NOTE — HISTORY OF PRESENT ILLNESS
[FreeTextEntry1] : 60 yo M with PMH HTN who presents with low back pain.\par \par Location: right lower lumbar spine\par Onset:  acute on chronic  - last flare up occurred 3 months ago and still ongoing\par Characteristics: sharp, dull ache\par Aggravating factors: prolonged sitting\par Alleviating factors: rest\par Radiation: denies\par Treatments: tylenol, NSAIDs, PO steroids with some improvement, physical therapy and HEP without relief\par Severity: 6-8/10\par \par Diagnostic studies:\par XR lumbar spine showing straightening of lumbar lordosis, minimal retrolisthesis of L5 on S1, mild loss of L1-L2 disc height, no fractures or dislocations and a 6 mm non-specific lucency overlying the S3 vertebral body.\par No previous EMG/NCS\par \par Patient denies new weakness, numbness or paresthesia.  Patient denies bowel/bladder dysfunction, fevers, chills, weight loss, night pain, or night sweats.\par

## 2020-07-16 NOTE — ASSESSMENT
[FreeTextEntry1] : 60 yo M who presents with low back pain consistent with lumbar spondylosis - facet mediated.  MRI was unremarkable but will obtain official report from [facility].  He has exhausted conservative management with PT, oral medications (NSAID, muscle relaxant, steroid).  Discuss risks/benefits of diagnostic procedure - MBB and potential RFA after.\par \par - bilateral L3, L4, L5 MBB\par \par \par \par

## 2020-07-17 ENCOUNTER — APPOINTMENT (OUTPATIENT)
Dept: MRI IMAGING | Facility: CLINIC | Age: 60
End: 2020-07-17

## 2020-07-18 ENCOUNTER — EMERGENCY (EMERGENCY)
Facility: HOSPITAL | Age: 60
LOS: 1 days | Discharge: HOME | End: 2020-07-18
Attending: STUDENT IN AN ORGANIZED HEALTH CARE EDUCATION/TRAINING PROGRAM
Payer: COMMERCIAL

## 2020-07-18 ENCOUNTER — EMERGENCY (EMERGENCY)
Facility: HOSPITAL | Age: 60
LOS: 1 days | Discharge: HOME | End: 2020-07-18
Attending: EMERGENCY MEDICINE
Payer: COMMERCIAL

## 2020-07-18 VITALS
SYSTOLIC BLOOD PRESSURE: 171 MMHG | HEIGHT: 67 IN | OXYGEN SATURATION: 98 % | TEMPERATURE: 98 F | DIASTOLIC BLOOD PRESSURE: 95 MMHG | WEIGHT: 176.37 LBS | HEART RATE: 82 BPM | RESPIRATION RATE: 18 BRPM

## 2020-07-18 VITALS
OXYGEN SATURATION: 95 % | TEMPERATURE: 98 F | HEART RATE: 98 BPM | DIASTOLIC BLOOD PRESSURE: 100 MMHG | WEIGHT: 158.07 LBS | RESPIRATION RATE: 18 BRPM | HEIGHT: 67 IN | SYSTOLIC BLOOD PRESSURE: 163 MMHG

## 2020-07-18 VITALS
SYSTOLIC BLOOD PRESSURE: 149 MMHG | OXYGEN SATURATION: 100 % | DIASTOLIC BLOOD PRESSURE: 91 MMHG | TEMPERATURE: 98 F | HEART RATE: 75 BPM | RESPIRATION RATE: 18 BRPM

## 2020-07-18 DIAGNOSIS — Z87.81 PERSONAL HISTORY OF (HEALED) TRAUMATIC FRACTURE: Chronic | ICD-10-CM

## 2020-07-18 LAB
ALBUMIN SERPL ELPH-MCNC: 4.2 G/DL — SIGNIFICANT CHANGE UP (ref 3.3–5)
ALP SERPL-CCNC: 47 U/L — SIGNIFICANT CHANGE UP (ref 40–120)
ALT FLD-CCNC: 20 U/L — SIGNIFICANT CHANGE UP (ref 10–45)
ANION GAP SERPL CALC-SCNC: 13 MMOL/L — SIGNIFICANT CHANGE UP (ref 5–17)
APPEARANCE UR: CLEAR — SIGNIFICANT CHANGE UP
AST SERPL-CCNC: 20 U/L — SIGNIFICANT CHANGE UP (ref 10–40)
BACTERIA # UR AUTO: NEGATIVE — SIGNIFICANT CHANGE UP
BILIRUB SERPL-MCNC: 0.6 MG/DL — SIGNIFICANT CHANGE UP (ref 0.2–1.2)
BILIRUB UR-MCNC: NEGATIVE — SIGNIFICANT CHANGE UP
BUN SERPL-MCNC: 14 MG/DL — SIGNIFICANT CHANGE UP (ref 7–23)
CALCIUM SERPL-MCNC: 8.9 MG/DL — SIGNIFICANT CHANGE UP (ref 8.4–10.5)
CHLORIDE SERPL-SCNC: 103 MMOL/L — SIGNIFICANT CHANGE UP (ref 96–108)
CO2 SERPL-SCNC: 23 MMOL/L — SIGNIFICANT CHANGE UP (ref 22–31)
COLOR SPEC: SIGNIFICANT CHANGE UP
CREAT SERPL-MCNC: 0.72 MG/DL — SIGNIFICANT CHANGE UP (ref 0.5–1.3)
DIFF PNL FLD: ABNORMAL
EPI CELLS # UR: 1 /HPF — SIGNIFICANT CHANGE UP
GLUCOSE SERPL-MCNC: 112 MG/DL — HIGH (ref 70–99)
GLUCOSE UR QL: NEGATIVE — SIGNIFICANT CHANGE UP
HCT VFR BLD CALC: 38.6 % — LOW (ref 39–50)
HGB BLD-MCNC: 12.8 G/DL — LOW (ref 13–17)
HYALINE CASTS # UR AUTO: 0 /LPF — SIGNIFICANT CHANGE UP (ref 0–2)
KETONES UR-MCNC: NEGATIVE — SIGNIFICANT CHANGE UP
LEUKOCYTE ESTERASE UR-ACNC: NEGATIVE — SIGNIFICANT CHANGE UP
MCHC RBC-ENTMCNC: 32 PG — SIGNIFICANT CHANGE UP (ref 27–34)
MCHC RBC-ENTMCNC: 33.2 GM/DL — SIGNIFICANT CHANGE UP (ref 32–36)
MCV RBC AUTO: 96.5 FL — SIGNIFICANT CHANGE UP (ref 80–100)
NITRITE UR-MCNC: NEGATIVE — SIGNIFICANT CHANGE UP
NRBC # BLD: 0 /100 WBCS — SIGNIFICANT CHANGE UP (ref 0–0)
OB PNL STL: NEGATIVE — SIGNIFICANT CHANGE UP
PH UR: 6.5 — SIGNIFICANT CHANGE UP (ref 5–8)
PLATELET # BLD AUTO: 106 K/UL — LOW (ref 150–400)
POTASSIUM SERPL-MCNC: 3.3 MMOL/L — LOW (ref 3.5–5.3)
POTASSIUM SERPL-SCNC: 3.3 MMOL/L — LOW (ref 3.5–5.3)
PROT SERPL-MCNC: 6.3 G/DL — SIGNIFICANT CHANGE UP (ref 6–8.3)
PROT UR-MCNC: ABNORMAL
RBC # BLD: 4 M/UL — LOW (ref 4.2–5.8)
RBC # FLD: 12.4 % — SIGNIFICANT CHANGE UP (ref 10.3–14.5)
RBC CASTS # UR COMP ASSIST: 474 /HPF — HIGH (ref 0–4)
SODIUM SERPL-SCNC: 139 MMOL/L — SIGNIFICANT CHANGE UP (ref 135–145)
SP GR SPEC: 1.01 — SIGNIFICANT CHANGE UP (ref 1.01–1.02)
UROBILINOGEN FLD QL: NEGATIVE — SIGNIFICANT CHANGE UP
WBC # BLD: 6.1 K/UL — SIGNIFICANT CHANGE UP (ref 3.8–10.5)
WBC # FLD AUTO: 6.1 K/UL — SIGNIFICANT CHANGE UP (ref 3.8–10.5)
WBC UR QL: 2 /HPF — SIGNIFICANT CHANGE UP (ref 0–5)

## 2020-07-18 PROCEDURE — 99283 EMERGENCY DEPT VISIT LOW MDM: CPT

## 2020-07-18 PROCEDURE — 87086 URINE CULTURE/COLONY COUNT: CPT

## 2020-07-18 PROCEDURE — 82272 OCCULT BLD FECES 1-3 TESTS: CPT

## 2020-07-18 PROCEDURE — 74176 CT ABD & PELVIS W/O CONTRAST: CPT | Mod: 26

## 2020-07-18 PROCEDURE — 80053 COMPREHEN METABOLIC PANEL: CPT

## 2020-07-18 PROCEDURE — 74176 CT ABD & PELVIS W/O CONTRAST: CPT

## 2020-07-18 PROCEDURE — 76775 US EXAM ABDO BACK WALL LIM: CPT | Mod: 26

## 2020-07-18 PROCEDURE — 76775 US EXAM ABDO BACK WALL LIM: CPT

## 2020-07-18 PROCEDURE — 85027 COMPLETE CBC AUTOMATED: CPT

## 2020-07-18 PROCEDURE — 99284 EMERGENCY DEPT VISIT MOD MDM: CPT

## 2020-07-18 PROCEDURE — 81001 URINALYSIS AUTO W/SCOPE: CPT

## 2020-07-18 PROCEDURE — 99283 EMERGENCY DEPT VISIT LOW MDM: CPT | Mod: 25

## 2020-07-18 PROCEDURE — 99284 EMERGENCY DEPT VISIT MOD MDM: CPT | Mod: 25

## 2020-07-18 PROCEDURE — 51702 INSERT TEMP BLADDER CATH: CPT

## 2020-07-18 RX ORDER — POTASSIUM CHLORIDE 20 MEQ
40 PACKET (EA) ORAL ONCE
Refills: 0 | Status: COMPLETED | OUTPATIENT
Start: 2020-07-18 | End: 2020-07-18

## 2020-07-18 RX ORDER — IBUPROFEN 200 MG
600 TABLET ORAL ONCE
Refills: 0 | Status: COMPLETED | OUTPATIENT
Start: 2020-07-18 | End: 2020-07-18

## 2020-07-18 RX ADMIN — Medication 40 MILLIEQUIVALENT(S): at 14:10

## 2020-07-18 RX ADMIN — Medication 600 MILLIGRAM(S): at 23:16

## 2020-07-18 NOTE — ED PROVIDER NOTE - CLINICAL SUMMARY MEDICAL DECISION MAKING FREE TEXT BOX
60yo M pmh BPH, Renal colic calculus s/p Lithotripsy in May presents with urinary retention for past 4 hours. Pt denies fever, nausea/vomitting, decreased PO intake. Bedside utlrasound was performed and a ramirez catheter was placed with immediate drainage of 1L of yellow urine. 60yo M pmh BPH, Renal colic calculus s/p Lithotripsy in May presents with urinary retention for past 4 hours. Pt denies fever, nausea/vomitting, decreased PO intake. Bedside ultrasound was performed and a Hui catheter was placed with immediate drainage of 1L of yellow urine.Hx of frequent UTI ,last one treated 5 weeks ago with Augmentin for enterococci ,no fever or chills but has mild CVA pain and tenderness ,will obtain blood work ,ua and culture and on reevaluation: ZR 58yo M pmh BPH, Renal colic calculus s/p Lithotripsy in March presents with urinary retention for past 4 hours. Pt denies fever, nausea/vomitting, decreased PO intake. Bedside ultrasound was performed and a Hui catheter was placed with immediate drainage of 1L of yellow urine. Hx of frequent UTI ,last one treated 5 weeks ago with Augmentin for enterococci ,no fever or chills but has mild CVA pain and tenderness ,will obtain blood work ,ua and culture and on reevaluation: ZR

## 2020-07-18 NOTE — ED ADULT NURSE NOTE - OBJECTIVE STATEMENT
58yo male presents with clogged ramirez. Pt was seen here earlier today and had ramirez placed for urinary retention. Pt states that ramirez was initally draining well, but then noticed leaking around ramirez cath. Denies fevers, chills. Pt A&Ox3. HOPKINS. Ambulates. Denies chest pain, sob. Respirations even/unlabored. Abd sodt. No n/v/d. Ramirez leaking. Clear, yellow urine noted in drainage bag. Ramirez changed to 18fr. as per MD order. Draining clear, yellow urine. Pt tolerated procedure well.

## 2020-07-18 NOTE — ED PROVIDER NOTE - CARE PROVIDERS DIRECT ADDRESSES
[email or web address]

## 2020-07-18 NOTE — ED ADULT NURSE REASSESSMENT NOTE - NS ED NURSE REASSESS COMMENT FT1
14 F ramirez placed with second RN present. Sterile equipment used and aseptic technique used. Pt tolerated well, 1L of clear yellow urine drained.   Pt feeling better, symptoms improved. Tolerating meal. labs sent, ua culture sent. Dispo pending.

## 2020-07-18 NOTE — ED PROVIDER NOTE - PROVIDER TOKENS
FREE:[LAST:[Goldberg],FIRST:[Antwon],PHONE:[phone]],FAX:[phone]   [phone]    -],ADDRESS:[[address] [place] [address]],FOLLOWUP:[1-3 Days]]

## 2020-07-18 NOTE — ED PROVIDER NOTE - NSFOLLOWUPINSTRUCTIONS_ED_ALL_ED_FT
You were evaluated and treated tonight for Hui Issue. Your Hui was changed to a larger size to help prevent leakage around the Hui.    Earlier at initial visit you had CT exam performed some stones within your kidneys. These stones are not causing obstruction, however we would like you to follow up with your Urologist Dr. Lal within the next 3-7 days for further evaluation and Hui catheter management.       Rest, stay hydrated and continue home medications. Maintain Hui care as advised. Please return to hospital if you feel fever, weakness, worsening pain, nausea, pain with urination, difficulty managing Hui catheter.

## 2020-07-18 NOTE — ED PROVIDER NOTE - CARE PROVIDER_API CALL
Goldberg, Gary  [address], [place] [address]  Phone: [phone]  Fax: (   )    -  Follow Up Time: 1-3 Days

## 2020-07-18 NOTE — ED ADULT NURSE NOTE - OBJECTIVE STATEMENT
60 y/o from home complains of urinary retention. Grimacing pain noted to abdomen during assessment. A&ox3. Last normal void with dribbling last night. s/p recent stone retrieval.  Ambulatory with steady gait. Skins intact. PMH BPH, kidney stones

## 2020-07-18 NOTE — ED PROVIDER NOTE - OBJECTIVE STATEMENT
59 year old Male pmhx HTN, diverticulosis, BPH, urinary retention, Renal calculus s/p Lithotripsy in March presenting with ramirez issue. Pt initially presented this morning with inability to urinate associated with abdominal fullness. In ED had labs, UA and CT and was d/c home with ramirez. Pt states initially ramirez was draining and then suddenly stopped. Thought there was a clot so cleaned bad. Pt states he feels pressure in penis and has noticed some urination outside catheter. Upon arrival pt with half filled ramirez bag which was emptied.  He denies fever, chills, chest pain, sob, abd pain, n/v/d, hematuria   Urologist: Dr. Antwon Marion.

## 2020-07-18 NOTE — ED PROVIDER NOTE - PATIENT PORTAL LINK FT
You can access the FollowMyHealth Patient Portal offered by [facility] by registering at the following website: [email or web address]. By joining [employer]’s FollowMyHealth portal, you will also be able to view your health information using other applications (apps) compatible with our system.

## 2020-07-18 NOTE — ED PROVIDER NOTE - PATIENT PORTAL LINK FT
You can access the FollowMyHealth Patient Portal offered by [facility] by registering at the following website: [email or web address]. By joining [employer]’s FollowMyHealth portal, you will also be able to view your health information using other applications (apps) compatible with our system.

## 2020-07-18 NOTE — ED PROVIDER NOTE - PROGRESS NOTE DETAILS
Hui catheterization placed at the bedside by nursing with immediate drainage of 1L of urine, yellow. Pt reports relief of pain and fullness. POC renal/bladder ultrasound performed, see PACs. Pt seen and reevaluated at the bedside. Abdomen is no longer tender, there is no suprapubic tenderness, no CVA tenderness. Initial lab work demonstrated a drop in hematocrit from 43 at last admission in March to 38.6 as well as a drop in hgb from 15 to 12.8. Guaiac test ordered. Hematuria noted on U/A, will follow up with CT abd/pelvis. CT abd/pelvis demonstrated no obstructing calculi, negative fecal occult blood test. Pt is improved. Will discharge with urinary catheter/leg bag for follow up with urologoy and GI for screening colonoscopy.

## 2020-07-18 NOTE — ED PROVIDER NOTE - FAMILY HISTORY
Father  Still living? Unknown  FH: CAD (coronary artery disease), Age at diagnosis: Age Unknown  Family hx of prostate cancer, Age at diagnosis: Age Unknown

## 2020-07-18 NOTE — ED PROVIDER NOTE - PROGRESS NOTE DETAILS
Pt ramirez changed from 16 to 18, will d/c now with continued plan to f/up outpt uro  Allison Quarles PA-C Pt requested to have a ramirez flush kit. States he had clogged ramirez before and had flushed the catheter at home before. Ramirez  flush kit with sterile saline provided to patient. Pt understands to preform the Ramirez flush under sterile conditions. Pt understands to return to ED if he has any back pain, fever, chills at home.

## 2020-07-18 NOTE — ED PROVIDER NOTE - CARE PROVIDER_API CALL
Goldberg, Gary D D  UROLOGY  [address] [place] [address]  Phone: [phone]  Fax: [phone]  Follow Up Time:

## 2020-07-18 NOTE — ED PROVIDER NOTE - OBJECTIVE STATEMENT
60yo M pmh HTN, BPH, urinary retention, Renal calculus s/p Lithotripsy in March presenting with inability to urinate since this morning. Pt complains of abdominal fullness and flank pain 2/10 in intensity without radiation. Last full urination was last night, has had normal PO intake. Pt denies any nausea/vomitting, fever/headache/chills, blood in the urine, dysuria, change in bowel habit, flank pain. Follows with urologist Dr. Antwon Marion. 60yo M pmh HTN, diverticulosis, BPH, urinary retention, Renal calculus s/p Lithotripsy in March presenting with inability to urinate since this morning. Pt complains of abdominal fullness and flank pain 2/10 in intensity without radiation. Last full urination was last night, has had normal PO intake. Pt denies any nausea/vomitting, fever/headache/chills, blood in the urine, dysuria, change in bowel habit, flank pain. Follows with urologist Dr. Antwon Marion.

## 2020-07-18 NOTE — ED PROVIDER NOTE - NSFOLLOWUPCLINICS_GEN_ALL_ED_FT
[facility]  Gastroenterology  [address] [place] [id number]  Phone: [phone]  Fax:   Follow Up Time:

## 2020-07-18 NOTE — ED PROVIDER NOTE - NSFOLLOWUPINSTRUCTIONS_ED_ALL_ED_FT
You were evaluated and treated today for urinary retention. CT exam performed some stones within your bladder as well as chronic stones in your kidneys. These stones are not causing obstruction, however we would like you to follow up with your Urologist Dr. Lal within the next 3-7 days for further evaluation and removal of ramirez catheter.     Additionally, your labs demonstrated a drop in your blood count (hematocrit and hemoglobin) concerning for new onset anemia. Please follow up with gastroenterology for a screening colonoscopy within the next 1-2 weeks.     Please return to hospital if you feel fever, weakness, worsening pain, nausea, pain with urination, difficulty managing ramirez catheter. You were evaluated and treated today for urinary retention. CT exam performed some stones within your kidneys. These stones are not causing obstruction, however we would like you to follow up with your Urologist Dr. Lal within the next 3-7 days for further evaluation and ramirez catheter management.     Additionally, your labs demonstrated a drop in your blood count (hematocrit and hemoglobin) concerning for new onset anemia. Please follow up with your PMD and gastroenterology for a screening colonoscopy within the next 1-2 weeks.     Please return to hospital if you feel fever, weakness, worsening pain, nausea, pain with urination, difficulty managing ramirez catheter.

## 2020-07-18 NOTE — ED PROVIDER NOTE - ATTENDING CONTRIBUTION TO CARE
ED attending Suri ARIZMENDI performed a history and physical exam of the patient and discussed their management with the resident/ACP. I reviewed the resident/ACP's note and agree with the documented findings and plan of care except for the following.     59 year old male with history of urinary retention s/p Ramirez catheter placement in the ED earlier today presented to ED with malfunctioning Ramirez. Pt states the Ramirez catheter stopped draining this afternoon. Also noticed significant amount urine leakage around the bladder. No fever, chills, abdominal or back pain. Impression: Likely obstructed Ramirez and/or small ramirez. Plan: Exchange with a large Ramirez. Follow up with urology as previously scheduled. Strict return precautions.

## 2020-07-19 LAB
CULTURE RESULTS: NO GROWTH — SIGNIFICANT CHANGE UP
SPECIMEN SOURCE: SIGNIFICANT CHANGE UP

## 2020-07-28 ENCOUNTER — APPOINTMENT (OUTPATIENT)
Dept: PHYSICAL MEDICINE AND REHAB | Facility: CLINIC | Age: 60
End: 2020-07-28

## 2020-08-10 ENCOUNTER — EMERGENCY (EMERGENCY)
Facility: HOSPITAL | Age: 60
LOS: 1 days | Discharge: HOME | End: 2020-08-10
Attending: STUDENT IN AN ORGANIZED HEALTH CARE EDUCATION/TRAINING PROGRAM
Payer: COMMERCIAL

## 2020-08-10 VITALS
TEMPERATURE: 98 F | SYSTOLIC BLOOD PRESSURE: 142 MMHG | RESPIRATION RATE: 18 BRPM | OXYGEN SATURATION: 96 % | HEART RATE: 65 BPM | DIASTOLIC BLOOD PRESSURE: 86 MMHG

## 2020-08-10 VITALS
RESPIRATION RATE: 18 BRPM | DIASTOLIC BLOOD PRESSURE: 83 MMHG | HEART RATE: 130 BPM | SYSTOLIC BLOOD PRESSURE: 131 MMHG | TEMPERATURE: 98 F | WEIGHT: 149.91 LBS | OXYGEN SATURATION: 98 % | HEIGHT: 67 IN

## 2020-08-10 DIAGNOSIS — Z87.81 PERSONAL HISTORY OF (HEALED) TRAUMATIC FRACTURE: Chronic | ICD-10-CM

## 2020-08-10 LAB
ANION GAP SERPL CALC-SCNC: 16 MMOL/L — SIGNIFICANT CHANGE UP (ref 5–17)
APPEARANCE UR: CLEAR — SIGNIFICANT CHANGE UP
BACTERIA # UR AUTO: NEGATIVE — SIGNIFICANT CHANGE UP
BASOPHILS # BLD AUTO: 0.01 K/UL — SIGNIFICANT CHANGE UP (ref 0–0.2)
BASOPHILS NFR BLD AUTO: 0.2 % — SIGNIFICANT CHANGE UP (ref 0–2)
BILIRUB UR-MCNC: NEGATIVE — SIGNIFICANT CHANGE UP
BUN SERPL-MCNC: 12 MG/DL — SIGNIFICANT CHANGE UP (ref 7–23)
CALCIUM SERPL-MCNC: 9.7 MG/DL — SIGNIFICANT CHANGE UP (ref 8.4–10.5)
CHLORIDE SERPL-SCNC: 104 MMOL/L — SIGNIFICANT CHANGE UP (ref 96–108)
CO2 SERPL-SCNC: 21 MMOL/L — LOW (ref 22–31)
COLOR SPEC: COLORLESS — SIGNIFICANT CHANGE UP
CREAT SERPL-MCNC: 0.79 MG/DL — SIGNIFICANT CHANGE UP (ref 0.5–1.3)
DIFF PNL FLD: ABNORMAL
EOSINOPHIL # BLD AUTO: 0.03 K/UL — SIGNIFICANT CHANGE UP (ref 0–0.5)
EOSINOPHIL NFR BLD AUTO: 0.6 % — SIGNIFICANT CHANGE UP (ref 0–6)
EPI CELLS # UR: 0 /HPF — SIGNIFICANT CHANGE UP
GLUCOSE SERPL-MCNC: 130 MG/DL — HIGH (ref 70–99)
GLUCOSE UR QL: NEGATIVE — SIGNIFICANT CHANGE UP
HCT VFR BLD CALC: 39.7 % — SIGNIFICANT CHANGE UP (ref 39–50)
HGB BLD-MCNC: 13.4 G/DL — SIGNIFICANT CHANGE UP (ref 13–17)
IMM GRANULOCYTES NFR BLD AUTO: 0.4 % — SIGNIFICANT CHANGE UP (ref 0–1.5)
KETONES UR-MCNC: NEGATIVE — SIGNIFICANT CHANGE UP
LEUKOCYTE ESTERASE UR-ACNC: NEGATIVE — SIGNIFICANT CHANGE UP
LYMPHOCYTES # BLD AUTO: 0.7 K/UL — LOW (ref 1–3.3)
LYMPHOCYTES # BLD AUTO: 14.5 % — SIGNIFICANT CHANGE UP (ref 13–44)
MCHC RBC-ENTMCNC: 32.4 PG — SIGNIFICANT CHANGE UP (ref 27–34)
MCHC RBC-ENTMCNC: 33.8 GM/DL — SIGNIFICANT CHANGE UP (ref 32–36)
MCV RBC AUTO: 96.1 FL — SIGNIFICANT CHANGE UP (ref 80–100)
MONOCYTES # BLD AUTO: 0.33 K/UL — SIGNIFICANT CHANGE UP (ref 0–0.9)
MONOCYTES NFR BLD AUTO: 6.8 % — SIGNIFICANT CHANGE UP (ref 2–14)
NEUTROPHILS # BLD AUTO: 3.74 K/UL — SIGNIFICANT CHANGE UP (ref 1.8–7.4)
NEUTROPHILS NFR BLD AUTO: 77.5 % — HIGH (ref 43–77)
NITRITE UR-MCNC: NEGATIVE — SIGNIFICANT CHANGE UP
NRBC # BLD: 0 /100 WBCS — SIGNIFICANT CHANGE UP (ref 0–0)
PH UR: 6.5 — SIGNIFICANT CHANGE UP (ref 5–8)
PLATELET # BLD AUTO: 132 K/UL — LOW (ref 150–400)
POTASSIUM SERPL-MCNC: 3.2 MMOL/L — LOW (ref 3.5–5.3)
POTASSIUM SERPL-SCNC: 3.2 MMOL/L — LOW (ref 3.5–5.3)
PROT UR-MCNC: NEGATIVE — SIGNIFICANT CHANGE UP
RBC # BLD: 4.13 M/UL — LOW (ref 4.2–5.8)
RBC # FLD: 12.3 % — SIGNIFICANT CHANGE UP (ref 10.3–14.5)
RBC CASTS # UR COMP ASSIST: 2 /HPF — SIGNIFICANT CHANGE UP (ref 0–4)
SODIUM SERPL-SCNC: 141 MMOL/L — SIGNIFICANT CHANGE UP (ref 135–145)
SP GR SPEC: 1.01 — LOW (ref 1.01–1.02)
UROBILINOGEN FLD QL: NEGATIVE — SIGNIFICANT CHANGE UP
WBC # BLD: 4.83 K/UL — SIGNIFICANT CHANGE UP (ref 3.8–10.5)
WBC # FLD AUTO: 4.83 K/UL — SIGNIFICANT CHANGE UP (ref 3.8–10.5)
WBC UR QL: 0 /HPF — SIGNIFICANT CHANGE UP (ref 0–5)

## 2020-08-10 PROCEDURE — 99283 EMERGENCY DEPT VISIT LOW MDM: CPT | Mod: 25

## 2020-08-10 PROCEDURE — 81001 URINALYSIS AUTO W/SCOPE: CPT

## 2020-08-10 PROCEDURE — 99284 EMERGENCY DEPT VISIT MOD MDM: CPT

## 2020-08-10 PROCEDURE — 87086 URINE CULTURE/COLONY COUNT: CPT

## 2020-08-10 PROCEDURE — 51702 INSERT TEMP BLADDER CATH: CPT

## 2020-08-10 PROCEDURE — 85027 COMPLETE CBC AUTOMATED: CPT

## 2020-08-10 PROCEDURE — 80048 BASIC METABOLIC PNL TOTAL CA: CPT

## 2020-08-10 RX ORDER — POTASSIUM CHLORIDE 20 MEQ
40 PACKET (EA) ORAL ONCE
Refills: 0 | Status: COMPLETED | OUTPATIENT
Start: 2020-08-10 | End: 2020-08-10

## 2020-08-10 RX ADMIN — Medication 40 MILLIEQUIVALENT(S): at 06:47

## 2020-08-10 NOTE — ED PROVIDER NOTE - PHYSICAL EXAMINATION
GENl: Patient awake alert NAD.   HEENT: normocephalic, atraumatic, EOMI, no scleral icterus.   CARDIAC: RRR, S1, S2, no murmur.   PULM: CTA B/L no wheeze, rhonchi, rales.   ABD: soft NT, ND, no rebound no guarding, no CVA tenderness.   MSK: Moving all extremities, no edema.  NEURO: A&Ox3, no focal neurological deficits, CN 2-12 grossly intact  SKIN: warm, dry, no rash.

## 2020-08-10 NOTE — ED PROVIDER NOTE - OBJECTIVE STATEMENT
60 yo M pmh of bph, pw urinary retention starting today. Pt endorse suprapubic pressure, denies fever chills, urinary symptoms, abdominal pain, GI symptoms. States he has had similar episodes of retention in the past. No other complaints.

## 2020-08-10 NOTE — ED PROVIDER NOTE - NS ED ROS FT
GENERAL: No fever, chills  EYES: no vision changes, no discharge.   ENT: no difficulty swallowing or speaking   CARDIAC: no chest pain/pressure, SOB, lower extremity swelling  PULMONARY: no cough, SOB  GI: no abdominal pain, n/v/d  : + urinary retention. no dysuria, no hematuria  SKIN: no rashes, no ecchymosis  NEURO: no headache, lightheadedness  MSK: No joint pain, myalgia, weakness.

## 2020-08-10 NOTE — ED ADULT NURSE NOTE - OBJECTIVE STATEMENT
59 year old male with a PMH of HTN, BPH and chronic urinary retention comes to the ED c/o urinary retention. Patient states last time he was able to fully empty bladder was at 10pm last night. Patient states urologist had pt scheduled for MRI on Tuesday for urinary complications. Upon arrival to ED, Patient is a/ox3, VSS, sensory/motor function intact, speaking coherently, follows commands and appears anxious/uncomfortable at this time. Bladder scan performed showing >700cc of urine in bladder. Double lumen Hui catheter placed with 2 RNs at bedside to maintain sterile technique, patient tolerated well, and clear yellow urine noted to be draining against gravity to drainage bag. Patient denies CP/SOB, f/c, n/v/d, dizziness/lightheadedness, numbness/tingling/weakness at this time.

## 2020-08-10 NOTE — ED PROVIDER NOTE - ATTENDING CONTRIBUTION TO CARE
ED attending Suri ARIZMENDI performed a history and physical exam of the patient and discussed their management with the resident/ACP. I reviewed the resident/ACP's note and agree with the documented findings and plan of care except for the following.    59 year old male with history of BPH presented with urinary retention. s/p ramirez placement. Plan: cbc, cmp, UA, Urine culture. Follow up with urology if workup negative

## 2020-08-10 NOTE — ED PROVIDER NOTE - CLINICAL SUMMARY MEDICAL DECISION MAKING FREE TEXT BOX
60 yo M, pmhx of BPH and urinary retention, pw urinary retention. no systemic or urinary symptoms. discomfort resolved after ramirez placement. Check UA, UCX, cbc, cmp. likely DC home.

## 2020-08-10 NOTE — ED PROVIDER NOTE - NSFOLLOWUPINSTRUCTIONS_ED_ALL_ED_FT
You were seen in the Emergency Department (ED) for urinary retention. A ramirez catheter was placed in the ED. Follow up with your urologist to have it removed.     Your potassium was a little low. Please eat bananas and follow up with your primary care doctor.     Please return to the ED if you experience any new or concerning symptoms, such as: urinary retention, blood in your urine, passing out, back pain, unable to eat or drink, unable to move or feel part of your body, fever, chills.     Thank you for visiting a [facility] ED.

## 2020-08-10 NOTE — ED ADULT NURSE REASSESSMENT NOTE - NS ED NURSE REASSESS COMMENT FT1
Hui catheter drainage bag changed to leg bag. Patient educated on how to empty leg bag and indications to be aware of to return to ED. Patient provides teach back communication and verbalizes understanding. Patient awaiting discharge papers.

## 2020-08-11 LAB
CULTURE RESULTS: NO GROWTH — SIGNIFICANT CHANGE UP
SPECIMEN SOURCE: SIGNIFICANT CHANGE UP

## 2020-08-13 ENCOUNTER — EMERGENCY (EMERGENCY)
Facility: HOSPITAL | Age: 60
LOS: 1 days | Discharge: HOME | End: 2020-08-13
Attending: EMERGENCY MEDICINE
Payer: COMMERCIAL

## 2020-08-13 VITALS
DIASTOLIC BLOOD PRESSURE: 74 MMHG | TEMPERATURE: 99 F | HEART RATE: 98 BPM | OXYGEN SATURATION: 99 % | RESPIRATION RATE: 18 BRPM | SYSTOLIC BLOOD PRESSURE: 137 MMHG

## 2020-08-13 VITALS
TEMPERATURE: 99 F | HEIGHT: 67 IN | RESPIRATION RATE: 18 BRPM | DIASTOLIC BLOOD PRESSURE: 88 MMHG | SYSTOLIC BLOOD PRESSURE: 145 MMHG | OXYGEN SATURATION: 98 % | WEIGHT: 154.1 LBS | HEART RATE: 89 BPM

## 2020-08-13 DIAGNOSIS — Z87.81 PERSONAL HISTORY OF (HEALED) TRAUMATIC FRACTURE: Chronic | ICD-10-CM

## 2020-08-13 LAB
ALBUMIN SERPL ELPH-MCNC: 4.8 G/DL — SIGNIFICANT CHANGE UP (ref 3.3–5)
ALP SERPL-CCNC: 60 U/L — SIGNIFICANT CHANGE UP (ref 40–120)
ALT FLD-CCNC: 15 U/L — SIGNIFICANT CHANGE UP (ref 10–45)
ANION GAP SERPL CALC-SCNC: 11 MMOL/L — SIGNIFICANT CHANGE UP (ref 5–17)
APPEARANCE UR: CLEAR — SIGNIFICANT CHANGE UP
AST SERPL-CCNC: 17 U/L — SIGNIFICANT CHANGE UP (ref 10–40)
BASOPHILS # BLD AUTO: 0.01 K/UL — SIGNIFICANT CHANGE UP (ref 0–0.2)
BASOPHILS NFR BLD AUTO: 0.1 % — SIGNIFICANT CHANGE UP (ref 0–2)
BILIRUB SERPL-MCNC: 0.7 MG/DL — SIGNIFICANT CHANGE UP (ref 0.2–1.2)
BILIRUB UR-MCNC: NEGATIVE — SIGNIFICANT CHANGE UP
BUN SERPL-MCNC: 12 MG/DL — SIGNIFICANT CHANGE UP (ref 7–23)
CALCIUM SERPL-MCNC: 9.4 MG/DL — SIGNIFICANT CHANGE UP (ref 8.4–10.5)
CHLORIDE SERPL-SCNC: 102 MMOL/L — SIGNIFICANT CHANGE UP (ref 96–108)
CO2 SERPL-SCNC: 26 MMOL/L — SIGNIFICANT CHANGE UP (ref 22–31)
COLOR SPEC: COLORLESS — SIGNIFICANT CHANGE UP
CREAT SERPL-MCNC: 0.82 MG/DL — SIGNIFICANT CHANGE UP (ref 0.5–1.3)
DIFF PNL FLD: NEGATIVE — SIGNIFICANT CHANGE UP
EOSINOPHIL # BLD AUTO: 0.04 K/UL — SIGNIFICANT CHANGE UP (ref 0–0.5)
EOSINOPHIL NFR BLD AUTO: 0.5 % — SIGNIFICANT CHANGE UP (ref 0–6)
GLUCOSE SERPL-MCNC: 97 MG/DL — SIGNIFICANT CHANGE UP (ref 70–99)
GLUCOSE UR QL: NEGATIVE — SIGNIFICANT CHANGE UP
HCT VFR BLD CALC: 42.7 % — SIGNIFICANT CHANGE UP (ref 39–50)
HGB BLD-MCNC: 14.2 G/DL — SIGNIFICANT CHANGE UP (ref 13–17)
IMM GRANULOCYTES NFR BLD AUTO: 0.4 % — SIGNIFICANT CHANGE UP (ref 0–1.5)
KETONES UR-MCNC: NEGATIVE — SIGNIFICANT CHANGE UP
LEUKOCYTE ESTERASE UR-ACNC: NEGATIVE — SIGNIFICANT CHANGE UP
LYMPHOCYTES # BLD AUTO: 0.74 K/UL — LOW (ref 1–3.3)
LYMPHOCYTES # BLD AUTO: 8.8 % — LOW (ref 13–44)
MCHC RBC-ENTMCNC: 32.9 PG — SIGNIFICANT CHANGE UP (ref 27–34)
MCHC RBC-ENTMCNC: 33.3 GM/DL — SIGNIFICANT CHANGE UP (ref 32–36)
MCV RBC AUTO: 98.8 FL — SIGNIFICANT CHANGE UP (ref 80–100)
MONOCYTES # BLD AUTO: 0.44 K/UL — SIGNIFICANT CHANGE UP (ref 0–0.9)
MONOCYTES NFR BLD AUTO: 5.2 % — SIGNIFICANT CHANGE UP (ref 2–14)
NEUTROPHILS # BLD AUTO: 7.16 K/UL — SIGNIFICANT CHANGE UP (ref 1.8–7.4)
NEUTROPHILS NFR BLD AUTO: 85 % — HIGH (ref 43–77)
NITRITE UR-MCNC: NEGATIVE — SIGNIFICANT CHANGE UP
NRBC # BLD: 0 /100 WBCS — SIGNIFICANT CHANGE UP (ref 0–0)
PH UR: 6.5 — SIGNIFICANT CHANGE UP (ref 5–8)
PLATELET # BLD AUTO: 116 K/UL — LOW (ref 150–400)
POTASSIUM SERPL-MCNC: 3.4 MMOL/L — LOW (ref 3.5–5.3)
POTASSIUM SERPL-SCNC: 3.4 MMOL/L — LOW (ref 3.5–5.3)
PROT SERPL-MCNC: 6.9 G/DL — SIGNIFICANT CHANGE UP (ref 6–8.3)
PROT UR-MCNC: NEGATIVE — SIGNIFICANT CHANGE UP
RBC # BLD: 4.32 M/UL — SIGNIFICANT CHANGE UP (ref 4.2–5.8)
RBC # FLD: 12.3 % — SIGNIFICANT CHANGE UP (ref 10.3–14.5)
SODIUM SERPL-SCNC: 139 MMOL/L — SIGNIFICANT CHANGE UP (ref 135–145)
SP GR SPEC: 1 — LOW (ref 1.01–1.02)
UROBILINOGEN FLD QL: NEGATIVE — SIGNIFICANT CHANGE UP
WBC # BLD: 8.42 K/UL — SIGNIFICANT CHANGE UP (ref 3.8–10.5)
WBC # FLD AUTO: 8.42 K/UL — SIGNIFICANT CHANGE UP (ref 3.8–10.5)

## 2020-08-13 PROCEDURE — 80053 COMPREHEN METABOLIC PANEL: CPT

## 2020-08-13 PROCEDURE — 99284 EMERGENCY DEPT VISIT MOD MDM: CPT | Mod: 25

## 2020-08-13 PROCEDURE — 74176 CT ABD & PELVIS W/O CONTRAST: CPT | Mod: 26

## 2020-08-13 PROCEDURE — 81003 URINALYSIS AUTO W/O SCOPE: CPT

## 2020-08-13 PROCEDURE — 87184 SC STD DISK METHOD PER PLATE: CPT

## 2020-08-13 PROCEDURE — 85027 COMPLETE CBC AUTOMATED: CPT

## 2020-08-13 PROCEDURE — 99285 EMERGENCY DEPT VISIT HI MDM: CPT

## 2020-08-13 PROCEDURE — 74176 CT ABD & PELVIS W/O CONTRAST: CPT

## 2020-08-13 PROCEDURE — 87186 SC STD MICRODIL/AGAR DIL: CPT

## 2020-08-13 PROCEDURE — 96375 TX/PRO/DX INJ NEW DRUG ADDON: CPT

## 2020-08-13 PROCEDURE — 87086 URINE CULTURE/COLONY COUNT: CPT

## 2020-08-13 PROCEDURE — 96365 THER/PROPH/DIAG IV INF INIT: CPT

## 2020-08-13 RX ORDER — POTASSIUM CHLORIDE 20 MEQ
40 PACKET (EA) ORAL ONCE
Refills: 0 | Status: COMPLETED | OUTPATIENT
Start: 2020-08-13 | End: 2020-08-13

## 2020-08-13 RX ORDER — ACETAMINOPHEN 500 MG
650 TABLET ORAL ONCE
Refills: 0 | Status: COMPLETED | OUTPATIENT
Start: 2020-08-13 | End: 2020-08-13

## 2020-08-13 RX ORDER — CEFPODOXIME PROXETIL 100 MG
1 TABLET ORAL
Qty: 20 | Refills: 0
Start: 2020-08-13 | End: 2020-08-22

## 2020-08-13 RX ORDER — KETOROLAC TROMETHAMINE 30 MG/ML
15 SYRINGE (ML) INJECTION ONCE
Refills: 0 | Status: DISCONTINUED | OUTPATIENT
Start: 2020-08-13 | End: 2020-08-13

## 2020-08-13 RX ORDER — SODIUM CHLORIDE 9 MG/ML
1000 INJECTION INTRAMUSCULAR; INTRAVENOUS; SUBCUTANEOUS ONCE
Refills: 0 | Status: COMPLETED | OUTPATIENT
Start: 2020-08-13 | End: 2020-08-13

## 2020-08-13 RX ORDER — MORPHINE SULFATE 50 MG/1
4 CAPSULE, EXTENDED RELEASE ORAL ONCE
Refills: 0 | Status: DISCONTINUED | OUTPATIENT
Start: 2020-08-13 | End: 2020-08-13

## 2020-08-13 RX ORDER — CEFTRIAXONE 500 MG/1
1000 INJECTION, POWDER, FOR SOLUTION INTRAMUSCULAR; INTRAVENOUS ONCE
Refills: 0 | Status: COMPLETED | OUTPATIENT
Start: 2020-08-13 | End: 2020-08-13

## 2020-08-13 RX ORDER — POTASSIUM CHLORIDE 20 MEQ
40 PACKET (EA) ORAL ONCE
Refills: 0 | Status: DISCONTINUED | OUTPATIENT
Start: 2020-08-13 | End: 2020-08-13

## 2020-08-13 RX ADMIN — CEFTRIAXONE 100 MILLIGRAM(S): 500 INJECTION, POWDER, FOR SOLUTION INTRAMUSCULAR; INTRAVENOUS at 12:04

## 2020-08-13 RX ADMIN — Medication 40 MILLIEQUIVALENT(S): at 09:45

## 2020-08-13 RX ADMIN — CEFTRIAXONE 1000 MILLIGRAM(S): 500 INJECTION, POWDER, FOR SOLUTION INTRAMUSCULAR; INTRAVENOUS at 12:55

## 2020-08-13 RX ADMIN — SODIUM CHLORIDE 1000 MILLILITER(S): 9 INJECTION INTRAMUSCULAR; INTRAVENOUS; SUBCUTANEOUS at 08:53

## 2020-08-13 RX ADMIN — Medication 15 MILLIGRAM(S): at 12:52

## 2020-08-13 RX ADMIN — MORPHINE SULFATE 4 MILLIGRAM(S): 50 CAPSULE, EXTENDED RELEASE ORAL at 09:25

## 2020-08-13 RX ADMIN — Medication 650 MILLIGRAM(S): at 09:25

## 2020-08-13 RX ADMIN — MORPHINE SULFATE 4 MILLIGRAM(S): 50 CAPSULE, EXTENDED RELEASE ORAL at 08:53

## 2020-08-13 RX ADMIN — Medication 650 MILLIGRAM(S): at 09:04

## 2020-08-13 NOTE — ED ADULT NURSE NOTE - CHPI ED NUR SYMPTOMS NEG
no bowel dysfunction/no fatigue/no neck tenderness/no numbness/no tingling/no difficulty bearing weight/no bladder dysfunction/no constipation/no anorexia/no motor function loss

## 2020-08-13 NOTE — ED PROVIDER NOTE - ATTENDING CONTRIBUTION TO CARE
59M, pmh bilat kidney stones, s/p lithotripsy in march, bph, seen in ED 3 days ago for urinary retention, s/p removal of ramirez yesterday, presents with severe low back pain. pain bialt low back/flank. non-radiating. "pressure" and "stabbing," 9/10, no sig alleviating or exacerbating factors. no dysuria, hematuria. no f/c. no n/v/d. pt states has been having no difficulty urinating. denies testicular pain, penile d/c. no LE numbness or weakness. No incontinence. No saddle anesthesia.    PE: NAD, NCAT, MMM, Trachea midline, Normal conjunctiva, lungs CTAB, S1/S2 RRR, Normal perfusion, 2+ radial pulses bilat, Abdomen Soft, NTND, No cva ttp, No rebound/guarding, No LE edema, No deformity of extremities, No rashes,  No focal motor or sensory deficits.     Hx bilat kidney stones, bph, recent urinary retention and ramirez. Ddx includes but not limited to cystitis, pyelonephritis, kidney stone. Check labs. CT a/p. Give symptomatic relief. Re-eval. - Dionisio Cotter MD 59M, pmh bilat kidney stones, s/p lithotripsy in march, bph, seen in ED 3 days ago for urinary retention, s/p removal of ramirez yesterday, presents with severe low back pain. pain bialt low back/flank. non-radiating. "pressure" and "stabbing," 9/10, no sig alleviating or exacerbating factors. no dysuria, hematuria. no f/c. no n/v/d. pt states has been having no difficulty urinating. denies testicular pain, penile d/c. no LE numbness or weakness. No incontinence. No IVDU. No saddle anesthesia.    PE: NAD, NCAT, MMM, Trachea midline, Normal conjunctiva, lungs CTAB, S1/S2 RRR, Normal perfusion, 2+ radial pulses bilat, Abdomen Soft, NTND, No cva ttp, No rebound/guarding, No LE edema, No deformity of extremities, No rashes,  No focal motor or sensory deficits.     Hx bilat kidney stones, bph, recent urinary retention and ramirez. Ddx includes but not limited to cystitis, pyelonephritis, kidney stone. Check labs. CT a/p. Give symptomatic relief. Re-eval. - Dionisio Cotter MD

## 2020-08-13 NOTE — ED PROVIDER NOTE - PATIENT PORTAL LINK FT
You can access the FollowMyHealth Patient Portal offered by [facility] by registering at the following website: [email or web address]. By joining [employer]’s FollowMyHealth portal, you will also be able to view your health information using other applications (apps) compatible with our system.

## 2020-08-13 NOTE — ED ADULT NURSE NOTE - OBJECTIVE STATEMENT
59y male presents to ED c/o bilateral flank pain starting at 0400 am this morning. Negative CVA tenderness. Pt states that yesterday his catheter was removed, but his "prostate is flaring up". Pt able to urinate freely, frequently, clear light yellow. denies dysuria hematuria and burning/ pain with urination. PMH BPH, kidney stones. A&Ox3, abd soft nontender nondistended, steady gait. Pt denies fevers/chills, numbness tingling, sob,cp, abd pain, n/v/d, urinary changes, bloody stools.

## 2020-08-13 NOTE — ED PROVIDER NOTE - NSFOLLOWUPINSTRUCTIONS_ED_ALL_ED_FT
You were seen in the ED for flank pain and pain with urination  The following labs/imaging were obtained: see attached (if applicable)  Take Cefpodoxime as indicated.  Take Acetaminophen (Tylenol 650mg each 8hrs) AND /OR Ibuprofen (Motrin 600mg each 8hrs) with meals for pain.  Take percocet for break through relief.   Return to the ED if you develop fever,  worsening or new concerning symptoms.  Follow up with Dr Goldberg within 1 week.   Discussed with pt results of work up, strict return precautions, and need for follow up.  Pt expressed understanding and agrees with plan.

## 2020-08-13 NOTE — ED PROVIDER NOTE - PROGRESS NOTE DETAILS
Pt re-evaluated. Back/flank pain sig improved. Pt however is now complaining of significant dysuria. Given this, recent ramirez catheter, will give ceftriaxone. Pt states that he spoke to his urologist, dr gary goldberg's office, and that dr goldberg wishes to see pt in ED. will call his office to conifirm this. - Dionisio Cotter MD Pt evaluated by Dr. Kidd, urology, who has cleared pt for d/c from his perspective. - Dionisio Cotter MD Reports improvement. Will f/u with Dr. Goldberg. Antibiotics sent to pharmacy.

## 2020-08-13 NOTE — ED ADULT NURSE REASSESSMENT NOTE - NS ED NURSE REASSESS COMMENT FT1
Pt resting comfortably in bed. Morphine 4mg and tylenol 650 given about 1 hour ago, reassessed and pain is 3/10. Hot packs provided for comfort. Call bell in reach, will continue to monitor

## 2020-08-13 NOTE — ED PROVIDER NOTE - CLINICAL SUMMARY MEDICAL DECISION MAKING FREE TEXT BOX
58 yo M with hx of blt kidney stones, lithotripsy in March 2010, BPH seen in the ED 3 days ago for urinary retention presents with blt flank pain since this morning. Vitals WNL. Exam unremarkable. Ddx persistent nephrolithiasis with possible hydronephrosis vs UTI. Will get cbc, cmp, UA, UC, renal US, provide analgesia, hydration and re-gena
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
## 2020-08-13 NOTE — ED ADULT NURSE REASSESSMENT NOTE - NS ED NURSE REASSESS COMMENT FT1
Bladder scan performed, 130ml residual urine. Pt urinating without difficulty. Pt resting comfortably in bed, will continue to monitor

## 2020-08-13 NOTE — ED PROVIDER NOTE - OBJECTIVE STATEMENT
58 yo M with hx of blt kidney stones, lithotripsy in March 2010, BPH seen in the ED 3 days ago for urinary retention presents with blt flank pain since this morning. Patient was discharged with a ramirez 3 days ago, ramirez was removed yesterday and developed a blt constant pressure and stabbing like 9/10 non-radiating pain with no associated dysuria, hematuria, fever, nausea or vomiting. Has been able to urinate normally after ramirez removal.

## 2020-08-15 LAB
-  AMIKACIN: SIGNIFICANT CHANGE UP
-  AMPICILLIN/SULBACTAM: SIGNIFICANT CHANGE UP
-  CEFEPIME: SIGNIFICANT CHANGE UP
-  CEFTAZIDIME: SIGNIFICANT CHANGE UP
-  CEFTRIAXONE: SIGNIFICANT CHANGE UP
-  CIPROFLOXACIN: SIGNIFICANT CHANGE UP
-  GENTAMICIN: SIGNIFICANT CHANGE UP
-  IMIPENEM: SIGNIFICANT CHANGE UP
-  LEVOFLOXACIN: SIGNIFICANT CHANGE UP
-  MEROPENEM: SIGNIFICANT CHANGE UP
-  PIPERACILLIN/TAZOBACTAM: SIGNIFICANT CHANGE UP
-  TOBRAMYCIN: SIGNIFICANT CHANGE UP
-  TRIMETHOPRIM/SULFAMETHOXAZOLE: SIGNIFICANT CHANGE UP
CULTURE RESULTS: SIGNIFICANT CHANGE UP
METHOD TYPE: SIGNIFICANT CHANGE UP
METHOD TYPE: SIGNIFICANT CHANGE UP
ORGANISM # SPEC MICROSCOPIC CNT: SIGNIFICANT CHANGE UP
SPECIMEN SOURCE: SIGNIFICANT CHANGE UP

## 2020-08-15 NOTE — ED POST DISCHARGE NOTE - RESULT SUMMARY
UCx 50-99k acinetobacter nosocomialis sensitive to ceftriaxone, pt DC'ed on cefpodoxime, appropriate care received in ED no need for further contact at this time. -Bob Manzano PA-C

## 2020-08-25 ENCOUNTER — OUTPATIENT (OUTPATIENT)
Dept: OUTPATIENT SERVICES | Facility: HOSPITAL | Age: 60
LOS: 1 days | End: 2020-08-25

## 2020-08-25 VITALS
WEIGHT: 151.9 LBS | OXYGEN SATURATION: 96 % | DIASTOLIC BLOOD PRESSURE: 88 MMHG | HEIGHT: 67 IN | SYSTOLIC BLOOD PRESSURE: 149 MMHG | RESPIRATION RATE: 15 BRPM | TEMPERATURE: 98 F | HEART RATE: 63 BPM

## 2020-08-25 DIAGNOSIS — Z87.81 PERSONAL HISTORY OF (HEALED) TRAUMATIC FRACTURE: Chronic | ICD-10-CM

## 2020-08-25 DIAGNOSIS — Z98.890 OTHER SPECIFIED POSTPROCEDURAL STATES: Chronic | ICD-10-CM

## 2020-08-25 DIAGNOSIS — N40.1 BENIGN PROSTATIC HYPERPLASIA WITH LOWER URINARY TRACT SYMPTOMS: ICD-10-CM

## 2020-08-25 DIAGNOSIS — R31.0 GROSS HEMATURIA: ICD-10-CM

## 2020-08-25 DIAGNOSIS — Z01.818 ENCOUNTER FOR OTHER PREPROCEDURAL EXAMINATION: ICD-10-CM

## 2020-08-25 PROCEDURE — 87086 URINE CULTURE/COLONY COUNT: CPT

## 2020-08-25 PROCEDURE — 86901 BLOOD TYPING SEROLOGIC RH(D): CPT

## 2020-08-25 PROCEDURE — 85027 COMPLETE CBC AUTOMATED: CPT

## 2020-08-25 PROCEDURE — G0463: CPT

## 2020-08-25 PROCEDURE — 80048 BASIC METABOLIC PNL TOTAL CA: CPT

## 2020-08-25 PROCEDURE — 86850 RBC ANTIBODY SCREEN: CPT

## 2020-08-25 PROCEDURE — 86900 BLOOD TYPING SEROLOGIC ABO: CPT

## 2020-08-25 RX ORDER — CEFAZOLIN SODIUM 1 G
2000 VIAL (EA) INJECTION ONCE
Refills: 0 | Status: DISCONTINUED | OUTPATIENT
Start: 2020-09-01 | End: 2020-09-15

## 2020-08-25 RX ORDER — AMLODIPINE BESYLATE 2.5 MG/1
0 TABLET ORAL
Qty: 0 | Refills: 0 | DISCHARGE

## 2020-08-25 NOTE — H&P PST ADULT - NSICDXPROBLEM_GEN_ALL_CORE_FT
PROBLEM DIAGNOSES  Problem: Enlarged prostate with lower urinary tract symptoms (LUTS)  Assessment and Plan: TURProstate

## 2020-08-25 NOTE — H&P PST ADULT - NSICDXFAMILYHX_GEN_ALL_CORE_FT
FAMILY HISTORY:  Father  Still living? Unknown  Family hx of prostate cancer, Age at diagnosis: Age Unknown  FH: CAD (coronary artery disease), Age at diagnosis: Age Unknown

## 2020-08-25 NOTE — H&P PST ADULT - HISTORY OF PRESENT ILLNESS
59 year old male with hx of BPH has blood in urine, pressure, frequency work up need prostate surgery.       ***COVID  denies travel out of [place]  denies known exposure  denies s/s   swab 8/29 Manpreet
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
## 2020-08-29 ENCOUNTER — APPOINTMENT (OUTPATIENT)
Dept: DISASTER EMERGENCY | Facility: CLINIC | Age: 60
End: 2020-08-29

## 2020-08-29 LAB — SARS-COV-2 N GENE NPH QL NAA+PROBE: NOT DETECTED

## 2020-08-31 ENCOUNTER — TRANSCRIPTION ENCOUNTER (OUTPATIENT)
Age: 60
End: 2020-08-31

## 2020-09-01 ENCOUNTER — RESULT REVIEW (OUTPATIENT)
Age: 60
End: 2020-09-01

## 2020-09-01 ENCOUNTER — OUTPATIENT (OUTPATIENT)
Dept: OUTPATIENT SERVICES | Facility: HOSPITAL | Age: 60
LOS: 1 days | End: 2020-09-01
Payer: COMMERCIAL

## 2020-09-01 VITALS
TEMPERATURE: 98 F | SYSTOLIC BLOOD PRESSURE: 144 MMHG | DIASTOLIC BLOOD PRESSURE: 80 MMHG | RESPIRATION RATE: 18 BRPM | HEIGHT: 67 IN | WEIGHT: 151.9 LBS | OXYGEN SATURATION: 98 % | HEART RATE: 59 BPM

## 2020-09-01 DIAGNOSIS — Z98.890 OTHER SPECIFIED POSTPROCEDURAL STATES: Chronic | ICD-10-CM

## 2020-09-01 DIAGNOSIS — N40.1 BENIGN PROSTATIC HYPERPLASIA WITH LOWER URINARY TRACT SYMPTOMS: ICD-10-CM

## 2020-09-01 DIAGNOSIS — Z87.81 PERSONAL HISTORY OF (HEALED) TRAUMATIC FRACTURE: Chronic | ICD-10-CM

## 2020-09-01 DIAGNOSIS — R31.0 GROSS HEMATURIA: ICD-10-CM

## 2020-09-01 LAB
ANION GAP SERPL CALC-SCNC: 10 MMOL/L — SIGNIFICANT CHANGE UP (ref 5–17)
BUN SERPL-MCNC: 15 MG/DL — SIGNIFICANT CHANGE UP (ref 7–23)
CALCIUM SERPL-MCNC: 7.6 MG/DL — LOW (ref 8.4–10.5)
CHLORIDE SERPL-SCNC: 108 MMOL/L — SIGNIFICANT CHANGE UP (ref 96–108)
CO2 SERPL-SCNC: 22 MMOL/L — SIGNIFICANT CHANGE UP (ref 22–31)
CREAT SERPL-MCNC: 0.74 MG/DL — SIGNIFICANT CHANGE UP (ref 0.5–1.3)
GLUCOSE SERPL-MCNC: 191 MG/DL — HIGH (ref 70–99)
HCT VFR BLD CALC: 34.4 % — LOW (ref 39–50)
HGB BLD-MCNC: 11.3 G/DL — LOW (ref 13–17)
MCHC RBC-ENTMCNC: 32.8 GM/DL — SIGNIFICANT CHANGE UP (ref 32–36)
MCHC RBC-ENTMCNC: 32.8 PG — SIGNIFICANT CHANGE UP (ref 27–34)
MCV RBC AUTO: 100 FL — SIGNIFICANT CHANGE UP (ref 80–100)
NRBC # BLD: 0 /100 WBCS — SIGNIFICANT CHANGE UP (ref 0–0)
PLATELET # BLD AUTO: 134 K/UL — LOW (ref 150–400)
POTASSIUM SERPL-MCNC: 3.6 MMOL/L — SIGNIFICANT CHANGE UP (ref 3.5–5.3)
POTASSIUM SERPL-SCNC: 3.6 MMOL/L — SIGNIFICANT CHANGE UP (ref 3.5–5.3)
RBC # BLD: 3.44 M/UL — LOW (ref 4.2–5.8)
RBC # FLD: 12.3 % — SIGNIFICANT CHANGE UP (ref 10.3–14.5)
SODIUM SERPL-SCNC: 140 MMOL/L — SIGNIFICANT CHANGE UP (ref 135–145)
WBC # BLD: 12.06 K/UL — HIGH (ref 3.8–10.5)
WBC # FLD AUTO: 12.06 K/UL — HIGH (ref 3.8–10.5)

## 2020-09-01 PROCEDURE — 88305 TISSUE EXAM BY PATHOLOGIST: CPT | Mod: 26

## 2020-09-01 RX ORDER — ATROPA BELLADONNA AND OPIUM 16.2; 6 MG/1; MG/1
1 SUPPOSITORY RECTAL THREE TIMES A DAY
Refills: 0 | Status: DISCONTINUED | OUTPATIENT
Start: 2020-09-01 | End: 2020-09-01

## 2020-09-01 RX ORDER — FENTANYL CITRATE 50 UG/ML
50 INJECTION INTRAVENOUS
Refills: 0 | Status: DISCONTINUED | OUTPATIENT
Start: 2020-09-01 | End: 2020-09-01

## 2020-09-01 RX ORDER — CEFAZOLIN SODIUM 1 G
1000 VIAL (EA) INJECTION EVERY 8 HOURS
Refills: 0 | Status: DISCONTINUED | OUTPATIENT
Start: 2020-09-01 | End: 2020-09-15

## 2020-09-01 RX ORDER — HYDROMORPHONE HYDROCHLORIDE 2 MG/ML
0.5 INJECTION INTRAMUSCULAR; INTRAVENOUS; SUBCUTANEOUS
Refills: 0 | Status: DISCONTINUED | OUTPATIENT
Start: 2020-09-01 | End: 2020-09-01

## 2020-09-01 RX ORDER — ONDANSETRON 8 MG/1
4 TABLET, FILM COATED ORAL ONCE
Refills: 0 | Status: DISCONTINUED | OUTPATIENT
Start: 2020-09-01 | End: 2020-09-01

## 2020-09-01 RX ORDER — OXYCODONE AND ACETAMINOPHEN 5; 325 MG/1; MG/1
1 TABLET ORAL EVERY 4 HOURS
Refills: 0 | Status: DISCONTINUED | OUTPATIENT
Start: 2020-09-01 | End: 2020-09-02

## 2020-09-01 RX ORDER — HYDROMORPHONE HYDROCHLORIDE 2 MG/ML
0.25 INJECTION INTRAMUSCULAR; INTRAVENOUS; SUBCUTANEOUS
Refills: 0 | Status: DISCONTINUED | OUTPATIENT
Start: 2020-09-01 | End: 2020-09-01

## 2020-09-01 RX ORDER — METOCLOPRAMIDE HCL 10 MG
10 TABLET ORAL ONCE
Refills: 0 | Status: DISCONTINUED | OUTPATIENT
Start: 2020-09-01 | End: 2020-09-01

## 2020-09-01 RX ORDER — LIDOCAINE HCL 20 MG/ML
0.2 VIAL (ML) INJECTION ONCE
Refills: 0 | Status: DISCONTINUED | OUTPATIENT
Start: 2020-09-01 | End: 2020-09-01

## 2020-09-01 RX ORDER — HEPARIN SODIUM 5000 [USP'U]/ML
5000 INJECTION INTRAVENOUS; SUBCUTANEOUS EVERY 8 HOURS
Refills: 0 | Status: DISCONTINUED | OUTPATIENT
Start: 2020-09-01 | End: 2020-09-15

## 2020-09-01 RX ORDER — ACETAMINOPHEN 500 MG
650 TABLET ORAL EVERY 6 HOURS
Refills: 0 | Status: DISCONTINUED | OUTPATIENT
Start: 2020-09-01 | End: 2020-09-15

## 2020-09-01 RX ORDER — SODIUM CHLORIDE 9 MG/ML
1000 INJECTION, SOLUTION INTRAVENOUS
Refills: 0 | Status: DISCONTINUED | OUTPATIENT
Start: 2020-09-01 | End: 2020-09-15

## 2020-09-01 RX ORDER — FENTANYL CITRATE 50 UG/ML
25 INJECTION INTRAVENOUS
Refills: 0 | Status: DISCONTINUED | OUTPATIENT
Start: 2020-09-01 | End: 2020-09-01

## 2020-09-01 RX ORDER — DOXAZOSIN MESYLATE 4 MG
4 TABLET ORAL AT BEDTIME
Refills: 0 | Status: DISCONTINUED | OUTPATIENT
Start: 2020-09-01 | End: 2020-09-15

## 2020-09-01 RX ORDER — CHLORHEXIDINE GLUCONATE 213 G/1000ML
1 SOLUTION TOPICAL DAILY
Refills: 0 | Status: DISCONTINUED | OUTPATIENT
Start: 2020-09-01 | End: 2020-09-15

## 2020-09-01 RX ORDER — LIDOCAINE 4 G/100G
1 CREAM TOPICAL
Refills: 0 | Status: DISCONTINUED | OUTPATIENT
Start: 2020-09-01 | End: 2020-09-15

## 2020-09-01 RX ORDER — SODIUM CHLORIDE 9 MG/ML
3 INJECTION INTRAMUSCULAR; INTRAVENOUS; SUBCUTANEOUS EVERY 8 HOURS
Refills: 0 | Status: DISCONTINUED | OUTPATIENT
Start: 2020-09-01 | End: 2020-09-01

## 2020-09-01 RX ORDER — SENNA PLUS 8.6 MG/1
2 TABLET ORAL AT BEDTIME
Refills: 0 | Status: DISCONTINUED | OUTPATIENT
Start: 2020-09-01 | End: 2020-09-15

## 2020-09-01 RX ADMIN — SODIUM CHLORIDE 100 MILLILITER(S): 9 INJECTION, SOLUTION INTRAVENOUS at 22:47

## 2020-09-01 RX ADMIN — HYDROMORPHONE HYDROCHLORIDE 0.25 MILLIGRAM(S): 2 INJECTION INTRAMUSCULAR; INTRAVENOUS; SUBCUTANEOUS at 12:00

## 2020-09-01 RX ADMIN — Medication 4 MILLIGRAM(S): at 22:25

## 2020-09-01 RX ADMIN — HYDROMORPHONE HYDROCHLORIDE 0.25 MILLIGRAM(S): 2 INJECTION INTRAMUSCULAR; INTRAVENOUS; SUBCUTANEOUS at 11:40

## 2020-09-01 RX ADMIN — OXYCODONE AND ACETAMINOPHEN 1 TABLET(S): 5; 325 TABLET ORAL at 19:33

## 2020-09-01 RX ADMIN — LIDOCAINE 1 APPLICATION(S): 4 CREAM TOPICAL at 19:33

## 2020-09-01 RX ADMIN — OXYCODONE AND ACETAMINOPHEN 1 TABLET(S): 5; 325 TABLET ORAL at 14:54

## 2020-09-01 RX ADMIN — HEPARIN SODIUM 5000 UNIT(S): 5000 INJECTION INTRAVENOUS; SUBCUTANEOUS at 14:48

## 2020-09-01 RX ADMIN — SENNA PLUS 2 TABLET(S): 8.6 TABLET ORAL at 22:32

## 2020-09-01 RX ADMIN — Medication 100 MILLIGRAM(S): at 22:25

## 2020-09-01 RX ADMIN — OXYCODONE AND ACETAMINOPHEN 1 TABLET(S): 5; 325 TABLET ORAL at 15:54

## 2020-09-01 RX ADMIN — HEPARIN SODIUM 5000 UNIT(S): 5000 INJECTION INTRAVENOUS; SUBCUTANEOUS at 22:26

## 2020-09-01 RX ADMIN — OXYCODONE AND ACETAMINOPHEN 1 TABLET(S): 5; 325 TABLET ORAL at 20:00

## 2020-09-01 RX ADMIN — Medication 100 MILLIGRAM(S): at 14:52

## 2020-09-01 RX ADMIN — SODIUM CHLORIDE 100 MILLILITER(S): 9 INJECTION, SOLUTION INTRAVENOUS at 11:44

## 2020-09-01 NOTE — PRE-OP CHECKLIST - SITE MARKED BY SURGEON
Gastrostomy malfunction Gastrostomy malfunction Gastrostomy malfunction Gastrostomy malfunction Gastrostomy malfunction Gastrostomy malfunction n/a

## 2020-09-01 NOTE — PROGRESS NOTE ADULT - ASSESSMENT
A/P: 59y Male s/p TURP  DVT prophylaxis/OOB  Incentive spirometry  Strict I&O's  Analgesia and antiemetics as needed  Diet  AM labs  Clamp CBI in AM. Home with ramirez if color appropriate and pain adequately controlled  Dispo planning, will DC with 7d of Keflex and follow up with Dr. Goldberg on 9/8 for TOV outpatient.

## 2020-09-01 NOTE — PROGRESS NOTE ADULT - SUBJECTIVE AND OBJECTIVE BOX
Post op Check    Pt seen and examined without complaints. Pain is controlled. Denies SOB/CP/N/V.     Vital Signs Last 24 Hrs  T(C): 36.2 (01 Sep 2020 12:45), Max: 36.5 (01 Sep 2020 06:46)  T(F): 97.2 (01 Sep 2020 12:45), Max: 97.7 (01 Sep 2020 06:46)  HR: 76 (01 Sep 2020 13:15) (59 - 100)  BP: 131/70 (01 Sep 2020 13:15) (112/59 - 144/80)  BP(mean): 95 (01 Sep 2020 13:15) (80 - 97)  RR: 20 (01 Sep 2020 13:15) (18 - 20)  SpO2: 100% (01 Sep 2020 13:15) (94% - 100%)    I&O's Summary      Physical Exam  Gen: NAD, A&Ox3  Pulm: No respiratory distress, no subcostal retractions  Abd: Soft, NT, ND  : No suprapubic tenderness. 3way ramirez in place on CBI with crystal clear outputs.                          11.3   12.06 )-----------( 134      ( 01 Sep 2020 11:06 )             34.4       09-01    140  |  108  |  15  ----------------------------<  191<H>  3.6   |  22  |  0.74    Ca    7.6<L>      01 Sep 2020 11:06

## 2020-09-02 ENCOUNTER — TRANSCRIPTION ENCOUNTER (OUTPATIENT)
Age: 60
End: 2020-09-02

## 2020-09-02 VITALS
DIASTOLIC BLOOD PRESSURE: 61 MMHG | HEART RATE: 73 BPM | SYSTOLIC BLOOD PRESSURE: 158 MMHG | TEMPERATURE: 98 F | OXYGEN SATURATION: 96 % | RESPIRATION RATE: 18 BRPM

## 2020-09-02 PROCEDURE — 88305 TISSUE EXAM BY PATHOLOGIST: CPT

## 2020-09-02 PROCEDURE — 86901 BLOOD TYPING SEROLOGIC RH(D): CPT

## 2020-09-02 PROCEDURE — 86900 BLOOD TYPING SEROLOGIC ABO: CPT

## 2020-09-02 PROCEDURE — 85027 COMPLETE CBC AUTOMATED: CPT

## 2020-09-02 PROCEDURE — 80048 BASIC METABOLIC PNL TOTAL CA: CPT

## 2020-09-02 PROCEDURE — 52601 PROSTATECTOMY (TURP): CPT

## 2020-09-02 RX ORDER — CEPHALEXIN 500 MG
1 CAPSULE ORAL
Qty: 14 | Refills: 0
Start: 2020-09-02 | End: 2020-09-08

## 2020-09-02 RX ORDER — IBUPROFEN 200 MG
600 TABLET ORAL
Qty: 0 | Refills: 0 | DISCHARGE

## 2020-09-02 RX ORDER — SENNA PLUS 8.6 MG/1
2 TABLET ORAL
Qty: 0 | Refills: 0 | DISCHARGE
Start: 2020-09-02

## 2020-09-02 RX ORDER — LIDOCAINE 4 G/100G
1 CREAM TOPICAL
Qty: 3 | Refills: 0
Start: 2020-09-02 | End: 2020-09-04

## 2020-09-02 RX ADMIN — OXYCODONE AND ACETAMINOPHEN 1 TABLET(S): 5; 325 TABLET ORAL at 03:30

## 2020-09-02 RX ADMIN — OXYCODONE AND ACETAMINOPHEN 1 TABLET(S): 5; 325 TABLET ORAL at 02:54

## 2020-09-02 RX ADMIN — Medication 650 MILLIGRAM(S): at 05:41

## 2020-09-02 RX ADMIN — Medication 100 MILLIGRAM(S): at 05:38

## 2020-09-02 RX ADMIN — HEPARIN SODIUM 5000 UNIT(S): 5000 INJECTION INTRAVENOUS; SUBCUTANEOUS at 05:39

## 2020-09-02 RX ADMIN — OXYCODONE AND ACETAMINOPHEN 1 TABLET(S): 5; 325 TABLET ORAL at 08:07

## 2020-09-02 NOTE — PROGRESS NOTE ADULT - ASSESSMENT
A/P: 59y Male s/p TURP  DVT prophylaxis/OOB  Incentive spirometry  Analgesia and antiemetics as needed  Follow up labs   Home with ramirez   Dispo planning for today  Home with 7days of Keflex and follow up with Dr. Goldberg on 9/8 for TOV

## 2020-09-02 NOTE — PROGRESS NOTE ADULT - SUBJECTIVE AND OBJECTIVE BOX
UROLOGY DAILY PROGRESS NOTE:     Subjective: Patient seen and examined at bedside. No overnight events.     Objective:  Vital signs  T(F): , Max: 98.2 (09-02-20 @ 01:00)  HR: 53 (09-02-20 @ 05:00)  BP: 142/74 (09-02-20 @ 05:00)  SpO2: 95% (09-02-20 @ 05:00)  Wt(kg): --    I&O's Summary    01 Sep 2020 07:01  -  02 Sep 2020 07:00  --------------------------------------------------------  IN: 2700 mL / OUT: 0 mL / NET: 2700 mL    Gen: NAD  Pulm: No respiratory distress, no subcostal retractions  CV: RRR, no JVD  Abd: Soft, NT, ND  : Urine clear off CBI     Labs:  09-01  12.06 / 34.4  /0.74                           11.3   12.06 )-----------( 134      ( 01 Sep 2020 11:06 )             34.4     09-01    140  |  108  |  15  ----------------------------<  191<H>  3.6   |  22  |  0.74    Ca    7.6<L>      01 Sep 2020 11:06

## 2020-09-02 NOTE — DISCHARGE NOTE NURSING/CASE MANAGEMENT/SOCIAL WORK - PATIENT PORTAL LINK FT
You can access the FollowMyHealth Patient Portal offered by [facility] by registering at the following website: [email or web address]. By joining [facility]’s FollowMyHealth portal, you will also be able to view your health information using other applications (apps) compatible with our system.

## 2020-09-02 NOTE — PROGRESS NOTE ADULT - SUBJECTIVE AND OBJECTIVE BOX
Urology Progress Note    Overnight Events:  Has ramirez. Urine appears clear. CBI clamped. Complaining of penis pain    Review of Systems:   Constitutional: No weight loss, no weakness  CV: No chest pain, no chest pressure  Pulm: No shortness of breath, cough, or sputum    Physical Exam:  Vital signs  T(C): 36.6 (09-02-20 @ 05:00), Max: 36.8 (09-02-20 @ 01:00)  HR: 73 (09-02-20 @ 08:05)  BP: 158/61 (09-02-20 @ 08:05)  SpO2: 96% (09-02-20 @ 08:05)  Wt(kg): --    Gen: No acute distress. Normal mood  Abd: soft, non-tender, non-distended  : Non-palpable bladder    Labs:      09-01 @ 11:06    WBC 12.06 / Hct 34.4  / SCr 0.74     09-01    140  |  108  |  15  ----------------------------<  191<H>  3.6   |  22  |  0.74    Ca    7.6<L>      01 Sep 2020 11:06

## 2020-09-02 NOTE — PROGRESS NOTE ADULT - ASSESSMENT
M with BPH, gross hematuria, retention    -Ok for DC today  -Home with ramirez  -Monitor urine output Cr electrolytes  -Hydration  -Pain control. Home with pain meds  -Home with irrigation syringe to flush ramirez if develops clot retention  -Outpatient f/u with Dr. Goldberg

## 2020-09-04 LAB — SURGICAL PATHOLOGY STUDY: SIGNIFICANT CHANGE UP

## 2020-10-21 NOTE — ED PROVIDER NOTE - PROGRESS NOTE3
Quality 110: Preventive Care And Screening: Influenza Immunization: Influenza Immunization not Administered for Documented Reasons. Detail Level: Detailed Additional Notes: Patient does not receive flu vaccine due to personal reasons Improved.

## 2021-02-01 NOTE — ED PROVIDER NOTE - CONDITION AT DISCHARGE:
Routing refill request to provider for review/approval because:  Medication is reported/historical  Aria DEL TORO RN           Improved

## 2021-02-17 NOTE — ASU DISCHARGE PLAN (ADULT/PEDIATRIC) - PHYSICIAN SECTION COMPLETE
DATE OF VISIT:  06/01/2017    SUBJECTIVE:  The patient was seen, resting comfortably in bed.  NG tube remains in place with a large amount of bilious drainage present.  The patient states he is feeling better today, states abdomen distention is less, did have several small bowel movements, and is now passing flatus.  The patient's fevers have improved.    CURRENT MEDICATIONS:  Include;  1. Flagyl.  2. Zosyn.    OBJECTIVE:    GENERAL:  In no acute distress.  Appears fairly comfortable.  Awake and alert and orientated x3.     VITAL SIGNS:  Temperature is 98.3 with T-max 99.7, pulse 67, respirations 16, blood pressure is 102/62, oxygen saturation is 98% on room air.     HEART:  Regular rate and rhythm.     LUNGS:  Clear to auscultation.     ABDOMEN:  Less distended.  Less tenderness to palpation.  NG tube is connected to suction.  Hypoactive bowel sounds are present.     EXTREMITIES:  No edema, clubbing, or cyanosis.    LABORATORY DATA:  White blood cell count is 10.2, hemoglobin is 15.0, platelet count is 175.  BUN is 17, creatinine 0.99.    IMPRESSION:  A 15-year-old with a perforated acute appendicitis and interloop abscess, status post laparoscopic appendectomy and drainage of the abscess postop day #3.  The patient's fevers and leukocytosis have resolved.  The patient's postoperative course is complicated by an ileus, currently has NG tube in place.  Repeat imaging today shows ileus persists.    RECOMMENDATIONS:    1. Continue IV Zosyn and Flagyl.  2. Continue with ambulation.  3. Continue NG tube in n.p.o. Status.  4. Once ileus has resolved, the patient is taking a diet medications will be switched to oral antibiotic therapy.  5. The above was discussed with the patient and nursing staff.  6. The above was discussed with Dr. Paiz.        __________________________  Jeannette Brooke, CHANDRIKA  [id number]  Seen and examined, agree with above, slowly resolving ileus, encourage ambulation.  kailyn.    D:  06/01/2017  12:23:40  T:  06/01/2017 12:45:24   DORIS/carl   Job:  [id number]            Yes

## 2021-05-05 ENCOUNTER — NON-APPOINTMENT (OUTPATIENT)
Age: 61
End: 2021-05-05

## 2021-05-05 ENCOUNTER — APPOINTMENT (OUTPATIENT)
Dept: FAMILY MEDICINE | Facility: CLINIC | Age: 61
End: 2021-05-05
Payer: COMMERCIAL

## 2021-05-05 VITALS
HEART RATE: 61 BPM | TEMPERATURE: 98.1 F | SYSTOLIC BLOOD PRESSURE: 120 MMHG | BODY MASS INDEX: 25.41 KG/M2 | HEIGHT: 66.5 IN | WEIGHT: 160 LBS | OXYGEN SATURATION: 98 % | DIASTOLIC BLOOD PRESSURE: 80 MMHG

## 2021-05-05 DIAGNOSIS — Z82.49 FAMILY HISTORY OF ISCHEMIC HEART DISEASE AND OTHER DISEASES OF THE CIRCULATORY SYSTEM: ICD-10-CM

## 2021-05-05 DIAGNOSIS — Z80.42 FAMILY HISTORY OF MALIGNANT NEOPLASM OF PROSTATE: ICD-10-CM

## 2021-05-05 DIAGNOSIS — N20.0 CALCULUS OF KIDNEY: ICD-10-CM

## 2021-05-05 DIAGNOSIS — R10.31 RIGHT LOWER QUADRANT PAIN: ICD-10-CM

## 2021-05-05 DIAGNOSIS — G89.29 OTHER CHRONIC PAIN: ICD-10-CM

## 2021-05-05 DIAGNOSIS — Z87.19 PERSONAL HISTORY OF OTHER DISEASES OF THE DIGESTIVE SYSTEM: ICD-10-CM

## 2021-05-05 PROBLEM — N40.1 BENIGN PROSTATIC HYPERPLASIA WITH LOWER URINARY TRACT SYMPTOMS: Chronic | Status: ACTIVE | Noted: 2020-03-05

## 2021-05-05 PROBLEM — M51.26 OTHER INTERVERTEBRAL DISC DISPLACEMENT, LUMBAR REGION: Chronic | Status: ACTIVE | Noted: 2020-08-25

## 2021-05-05 PROCEDURE — 99072 ADDL SUPL MATRL&STAF TM PHE: CPT

## 2021-05-05 PROCEDURE — 99213 OFFICE O/P EST LOW 20 MIN: CPT

## 2021-05-05 NOTE — PHYSICAL EXAM
[Soft] : abdomen soft [Non-distended] : non-distended [Normal Bowel Sounds] : normal bowel sounds [Normal] : no joint swelling and grossly normal strength and tone [de-identified] : RLQ/suprapubic discomfort noted.  reducible bulge noted to the RLQ with discomfort.  no rebound tenderness or guarding, pain resolved following reduction.  pain with moving from supine to sitting int he region. [de-identified] : no pain with external/interal rotation of the right hip.  no pain with flexion or extension of the RLE

## 2021-05-05 NOTE — PLAN
[FreeTextEntry1] : Patient has been advised that his symptoms do appear to be related to a possible right inguinal hernia, and less likely prior damage to his right hip.  The pain was initially less, but has progressed over the past several weeks.  He denies any fever, writhing pain or inability to pass stool, and until the bulge was reduced today, he was unaware that this was present.  As such, we will order the CT of the abdomen and pelvis as an outpatient, but if he does develop any nausea, vomiting, fever, inability to pass stool or worsening pain, he will go to the ED immediately for evaluation.  If positive for a hernia, he will then be referred to a surgeon to schedule repair.  In the interim, he will avoid any heavy lifting, over exertion or ab exercises, which he has been doing regularly.

## 2021-05-05 NOTE — HISTORY OF PRESENT ILLNESS
[FreeTextEntry8] : Patient complains of a 1 year history of pain to his RLQ, which has worsened significantly over the past 2 weeks.  He did notice a pop in the area several times.  If he throws his legs up in the air and uses force to get up, he has pain in the region. He denies any fever, chills, diarrhea or constipation.  He also admits that when he coughs or sneezes, he does also need to hold his abdomen to avoid pain.  He was working with a  that strengthened his abs slightly improving the pain, but this has since returned.  He did have prostate surgery 1 year as well, a TURP, which was successful.  He denies any recent history or trauma or injury to the joint directly, but did have an injury to his upper abdomen. 1 year ago, but he did fracture his right acetabulum 20 years ago, requiring surgery.   He has been out of work x 1 year, but did work in [employer] for [employer] prior.

## 2021-05-06 ENCOUNTER — EMERGENCY (EMERGENCY)
Facility: HOSPITAL | Age: 61
LOS: 1 days | Discharge: HOME | End: 2021-05-06
Attending: EMERGENCY MEDICINE
Payer: COMMERCIAL

## 2021-05-06 VITALS
OXYGEN SATURATION: 100 % | WEIGHT: 153 LBS | TEMPERATURE: 98 F | DIASTOLIC BLOOD PRESSURE: 84 MMHG | HEART RATE: 71 BPM | RESPIRATION RATE: 20 BRPM | SYSTOLIC BLOOD PRESSURE: 143 MMHG | HEIGHT: 67 IN

## 2021-05-06 VITALS
SYSTOLIC BLOOD PRESSURE: 139 MMHG | TEMPERATURE: 98 F | OXYGEN SATURATION: 99 % | DIASTOLIC BLOOD PRESSURE: 84 MMHG | HEART RATE: 61 BPM | RESPIRATION RATE: 19 BRPM

## 2021-05-06 DIAGNOSIS — Z98.890 OTHER SPECIFIED POSTPROCEDURAL STATES: Chronic | ICD-10-CM

## 2021-05-06 DIAGNOSIS — Z87.81 PERSONAL HISTORY OF (HEALED) TRAUMATIC FRACTURE: Chronic | ICD-10-CM

## 2021-05-06 LAB
ALBUMIN SERPL ELPH-MCNC: 4.5 G/DL — SIGNIFICANT CHANGE UP (ref 3.3–5)
ALP SERPL-CCNC: 54 U/L — SIGNIFICANT CHANGE UP (ref 40–120)
ALT FLD-CCNC: 19 U/L — SIGNIFICANT CHANGE UP (ref 10–45)
ANION GAP SERPL CALC-SCNC: 11 MMOL/L — SIGNIFICANT CHANGE UP (ref 5–17)
APPEARANCE UR: CLEAR — SIGNIFICANT CHANGE UP
AST SERPL-CCNC: 19 U/L — SIGNIFICANT CHANGE UP (ref 10–40)
BACTERIA # UR AUTO: NEGATIVE — SIGNIFICANT CHANGE UP
BASOPHILS # BLD AUTO: 0.02 K/UL — SIGNIFICANT CHANGE UP (ref 0–0.2)
BASOPHILS NFR BLD AUTO: 0.3 % — SIGNIFICANT CHANGE UP (ref 0–2)
BILIRUB SERPL-MCNC: 0.5 MG/DL — SIGNIFICANT CHANGE UP (ref 0.2–1.2)
BILIRUB UR-MCNC: NEGATIVE — SIGNIFICANT CHANGE UP
BUN SERPL-MCNC: 17 MG/DL — SIGNIFICANT CHANGE UP (ref 7–23)
CALCIUM SERPL-MCNC: 8.8 MG/DL — SIGNIFICANT CHANGE UP (ref 8.4–10.5)
CHLORIDE SERPL-SCNC: 107 MMOL/L — SIGNIFICANT CHANGE UP (ref 96–108)
CO2 SERPL-SCNC: 22 MMOL/L — SIGNIFICANT CHANGE UP (ref 22–31)
COLOR SPEC: YELLOW — SIGNIFICANT CHANGE UP
CREAT SERPL-MCNC: 0.83 MG/DL — SIGNIFICANT CHANGE UP (ref 0.5–1.3)
DIFF PNL FLD: NEGATIVE — SIGNIFICANT CHANGE UP
EOSINOPHIL # BLD AUTO: 0.06 K/UL — SIGNIFICANT CHANGE UP (ref 0–0.5)
EOSINOPHIL NFR BLD AUTO: 0.9 % — SIGNIFICANT CHANGE UP (ref 0–6)
EPI CELLS # UR: 0 /HPF — SIGNIFICANT CHANGE UP
GLUCOSE SERPL-MCNC: 99 MG/DL — SIGNIFICANT CHANGE UP (ref 70–99)
GLUCOSE UR QL: NEGATIVE — SIGNIFICANT CHANGE UP
HCT VFR BLD CALC: 42 % — SIGNIFICANT CHANGE UP (ref 39–50)
HGB BLD-MCNC: 14.6 G/DL — SIGNIFICANT CHANGE UP (ref 13–17)
HYALINE CASTS # UR AUTO: 1 /LPF — SIGNIFICANT CHANGE UP (ref 0–2)
IMM GRANULOCYTES NFR BLD AUTO: 0.3 % — SIGNIFICANT CHANGE UP (ref 0–1.5)
KETONES UR-MCNC: NEGATIVE — SIGNIFICANT CHANGE UP
LEUKOCYTE ESTERASE UR-ACNC: NEGATIVE — SIGNIFICANT CHANGE UP
LYMPHOCYTES # BLD AUTO: 1.19 K/UL — SIGNIFICANT CHANGE UP (ref 1–3.3)
LYMPHOCYTES # BLD AUTO: 17.1 % — SIGNIFICANT CHANGE UP (ref 13–44)
MCHC RBC-ENTMCNC: 32.9 PG — SIGNIFICANT CHANGE UP (ref 27–34)
MCHC RBC-ENTMCNC: 34.8 GM/DL — SIGNIFICANT CHANGE UP (ref 32–36)
MCV RBC AUTO: 94.6 FL — SIGNIFICANT CHANGE UP (ref 80–100)
MONOCYTES # BLD AUTO: 0.45 K/UL — SIGNIFICANT CHANGE UP (ref 0–0.9)
MONOCYTES NFR BLD AUTO: 6.5 % — SIGNIFICANT CHANGE UP (ref 2–14)
NEUTROPHILS # BLD AUTO: 5.2 K/UL — SIGNIFICANT CHANGE UP (ref 1.8–7.4)
NEUTROPHILS NFR BLD AUTO: 74.9 % — SIGNIFICANT CHANGE UP (ref 43–77)
NITRITE UR-MCNC: NEGATIVE — SIGNIFICANT CHANGE UP
NRBC # BLD: 0 /100 WBCS — SIGNIFICANT CHANGE UP (ref 0–0)
PH UR: 7 — SIGNIFICANT CHANGE UP (ref 5–8)
PLATELET # BLD AUTO: 148 K/UL — LOW (ref 150–400)
POTASSIUM SERPL-MCNC: 4 MMOL/L — SIGNIFICANT CHANGE UP (ref 3.5–5.3)
POTASSIUM SERPL-SCNC: 4 MMOL/L — SIGNIFICANT CHANGE UP (ref 3.5–5.3)
PROT SERPL-MCNC: 6.5 G/DL — SIGNIFICANT CHANGE UP (ref 6–8.3)
PROT UR-MCNC: ABNORMAL
RBC # BLD: 4.44 M/UL — SIGNIFICANT CHANGE UP (ref 4.2–5.8)
RBC # FLD: 12 % — SIGNIFICANT CHANGE UP (ref 10.3–14.5)
RBC CASTS # UR COMP ASSIST: 1 /HPF — SIGNIFICANT CHANGE UP (ref 0–4)
SODIUM SERPL-SCNC: 140 MMOL/L — SIGNIFICANT CHANGE UP (ref 135–145)
SP GR SPEC: 1.03 — HIGH (ref 1.01–1.02)
UROBILINOGEN FLD QL: NEGATIVE — SIGNIFICANT CHANGE UP
WBC # BLD: 6.94 K/UL — SIGNIFICANT CHANGE UP (ref 3.8–10.5)
WBC # FLD AUTO: 6.94 K/UL — SIGNIFICANT CHANGE UP (ref 3.8–10.5)
WBC UR QL: 2 /HPF — SIGNIFICANT CHANGE UP (ref 0–5)

## 2021-05-06 PROCEDURE — 80053 COMPREHEN METABOLIC PANEL: CPT

## 2021-05-06 PROCEDURE — 87086 URINE CULTURE/COLONY COUNT: CPT

## 2021-05-06 PROCEDURE — 99285 EMERGENCY DEPT VISIT HI MDM: CPT

## 2021-05-06 PROCEDURE — G1004: CPT

## 2021-05-06 PROCEDURE — 81001 URINALYSIS AUTO W/SCOPE: CPT

## 2021-05-06 PROCEDURE — 85025 COMPLETE CBC W/AUTO DIFF WBC: CPT

## 2021-05-06 PROCEDURE — 99284 EMERGENCY DEPT VISIT MOD MDM: CPT | Mod: 25

## 2021-05-06 PROCEDURE — 96374 THER/PROPH/DIAG INJ IV PUSH: CPT | Mod: XU

## 2021-05-06 PROCEDURE — 74177 CT ABD & PELVIS W/CONTRAST: CPT

## 2021-05-06 PROCEDURE — 74177 CT ABD & PELVIS W/CONTRAST: CPT | Mod: 26,MG

## 2021-05-06 RX ORDER — OXYCODONE AND ACETAMINOPHEN 5; 325 MG/1; MG/1
1 TABLET ORAL
Qty: 12 | Refills: 0
Start: 2021-05-06 | End: 2021-05-08

## 2021-05-06 RX ORDER — KETOROLAC TROMETHAMINE 30 MG/ML
15 SYRINGE (ML) INJECTION ONCE
Refills: 0 | Status: DISCONTINUED | OUTPATIENT
Start: 2021-05-06 | End: 2021-05-06

## 2021-05-06 RX ADMIN — Medication 15 MILLIGRAM(S): at 15:50

## 2021-05-06 RX ADMIN — Medication 15 MILLIGRAM(S): at 15:18

## 2021-05-06 NOTE — ED PROVIDER NOTE - NSFOLLOWUPINSTRUCTIONS_ED_ALL_ED_FT
Your diagnosis: groin pain    Your CT scan did now show renal cysts, diverticulosis but nothing that would require an acute intervention.    Discharge instructions:    - Please follow up with a surgeon in the outpatient setting.    - Tylenol up to 650 mg every 8 hours as needed for pain.    - Be sure to return to the ED if you develop new or worsening symptoms. Specific signs and symptoms to be vigilant of: worsening or persistent abdominal pain, severe nausea/vomiting, inability to eat or drink, fever, chills. Your diagnosis: groin pain, possible hernia    Your CT scan did now show renal cysts, diverticulosis but nothing that would require an acute intervention.    Discharge instructions:    - Please follow up with a surgeon in the outpatient setting.    - Tylenol up to 650 mg every 8 hours as needed for mild pain.    - Percocet 5 mg-325 mg every 8 hours as needed for SEVERE pain. Do not take Tylenol while taking this medication.    - Be sure to return to the ED if you develop new or worsening symptoms. Specific signs and symptoms to be vigilant of: worsening or persistent abdominal pain, severe nausea/vomiting, inability to eat or drink, fever, chills.
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
## 2021-05-06 NOTE — ED PROVIDER NOTE - PROGRESS NOTE DETAILS
Joseph: patient endorsed to me from sign-out. CT negative for acute findings. Tolerating PO. Safe for discharge.

## 2021-05-06 NOTE — ED ADULT TRIAGE NOTE - INTERNATIONAL TRAVEL
Physical Therapy    Facility/Department: [facility] ICU  Initial Assessment    NAME: Bryon Mccray  : 1953  MRN: [id number]    [facility] CHF (congestive heart failure) ([facility] COPD (chronic obstructive pulmonary disease) ([facility] Depression, Diabetes mellitus ([facility] [facility] No
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
## 2021-05-06 NOTE — ED PROVIDER NOTE - ATTENDING CONTRIBUTION TO CARE
Dr. CANDY Hernandez:  I have independently evaluated the patient and have documented in the appropriate sections above.  I agree with the exam and plan as noted above.

## 2021-05-06 NOTE — ED PROVIDER NOTE - PATIENT PORTAL LINK FT
You can access the FollowMyHealth Patient Portal offered by [facility] by registering at the following website: [email or web address]. By joining [employer]’s FollowMyHealth portal, you will also be able to view your health information using other applications (apps) compatible with our system.

## 2021-05-06 NOTE — ED PROVIDER NOTE - NSFOLLOWUPCLINICS_GEN_ALL_ED_FT
[facility]  General Surgery  [address] [place] [address] - 3rd [place] [address]  Phone: [phone]  Fax:

## 2021-05-06 NOTE — ED ADULT NURSE NOTE - OBJECTIVE STATEMENT
60 yr old male came in after seeing his doctor because of pain in the lower right groin. had this for a year but getting worse. states a year ago he got out of bed felt a pop and has a small lump there. h/o high bp, bph, cholesterol. on assessment a and o x 3 lung clear abd soft non tender no swelling in extremities no n/v/d no fevers no other comlpaints. small lump on r groin

## 2021-05-07 ENCOUNTER — NON-APPOINTMENT (OUTPATIENT)
Age: 61
End: 2021-05-07

## 2021-05-08 LAB
CULTURE RESULTS: SIGNIFICANT CHANGE UP
SPECIMEN SOURCE: SIGNIFICANT CHANGE UP

## 2021-05-10 ENCOUNTER — APPOINTMENT (OUTPATIENT)
Dept: SURGERY | Facility: CLINIC | Age: 61
End: 2021-05-10
Payer: COMMERCIAL

## 2021-05-10 VITALS
SYSTOLIC BLOOD PRESSURE: 132 MMHG | WEIGHT: 160 LBS | BODY MASS INDEX: 25.71 KG/M2 | TEMPERATURE: 97.9 F | HEIGHT: 66 IN | HEART RATE: 80 BPM | DIASTOLIC BLOOD PRESSURE: 84 MMHG

## 2021-05-10 DIAGNOSIS — K40.20 BILATERAL INGUINAL HERNIA, W/OUT OBSTRUCTION OR GANGRENE, NOT SPECIFIED AS RECURRENT: ICD-10-CM

## 2021-05-10 PROCEDURE — 99203 OFFICE O/P NEW LOW 30 MIN: CPT

## 2021-05-10 PROCEDURE — 99072 ADDL SUPL MATRL&STAF TM PHE: CPT

## 2021-05-10 NOTE — ASSESSMENT
[FreeTextEntry1] : 59 yo male with bilateral inguinal hernias. Will schedule out patient laps repair

## 2021-05-10 NOTE — PHYSICAL EXAM
[Respiratory Effort] : normal respiratory effort [Alert] : alert [Oriented to Person] : oriented to person [Oriented to Place] : oriented to place [Oriented to Time] : oriented to time [Calm] : calm [JVD] : no jugular venous distention  [Abdominal Masses] : No abdominal masses [Abdomen Tenderness] : ~T ~M No abdominal tenderness [de-identified] : NAZANIN SCHMID NAD [de-identified] : EOMI [de-identified] : soft NT ND + b/l inguinal hernias, reducible

## 2021-05-11 ENCOUNTER — OUTPATIENT (OUTPATIENT)
Dept: OUTPATIENT SERVICES | Facility: HOSPITAL | Age: 61
LOS: 1 days | Discharge: HOME | End: 2021-05-11
Payer: COMMERCIAL

## 2021-05-11 VITALS
SYSTOLIC BLOOD PRESSURE: 148 MMHG | WEIGHT: 155.21 LBS | HEART RATE: 71 BPM | HEIGHT: 67 IN | RESPIRATION RATE: 18 BRPM | DIASTOLIC BLOOD PRESSURE: 86 MMHG | TEMPERATURE: 98 F | OXYGEN SATURATION: 97 %

## 2021-05-11 DIAGNOSIS — K40.20 BILATERAL INGUINAL HERNIA, WITHOUT OBSTRUCTION OR GANGRENE, NOT SPECIFIED AS RECURRENT: ICD-10-CM

## 2021-05-11 DIAGNOSIS — Z87.81 PERSONAL HISTORY OF (HEALED) TRAUMATIC FRACTURE: Chronic | ICD-10-CM

## 2021-05-11 DIAGNOSIS — Z01.818 ENCOUNTER FOR OTHER PREPROCEDURAL EXAMINATION: ICD-10-CM

## 2021-05-11 DIAGNOSIS — I10 ESSENTIAL (PRIMARY) HYPERTENSION: ICD-10-CM

## 2021-05-11 DIAGNOSIS — Z98.890 OTHER SPECIFIED POSTPROCEDURAL STATES: Chronic | ICD-10-CM

## 2021-05-11 LAB
ANION GAP SERPL CALC-SCNC: 3 MMOL/L — LOW (ref 5–17)
BASOPHILS # BLD AUTO: 0.02 K/UL — SIGNIFICANT CHANGE UP (ref 0–0.2)
BASOPHILS NFR BLD AUTO: 0.4 % — SIGNIFICANT CHANGE UP (ref 0–2)
BUN SERPL-MCNC: 17 MG/DL — SIGNIFICANT CHANGE UP (ref 7–23)
CALCIUM SERPL-MCNC: 9 MG/DL — SIGNIFICANT CHANGE UP (ref 8.5–10.1)
CHLORIDE SERPL-SCNC: 106 MMOL/L — SIGNIFICANT CHANGE UP (ref 96–108)
CO2 SERPL-SCNC: 31 MMOL/L — SIGNIFICANT CHANGE UP (ref 22–31)
CREAT SERPL-MCNC: 0.8 MG/DL — SIGNIFICANT CHANGE UP (ref 0.5–1.3)
EOSINOPHIL # BLD AUTO: 0.04 K/UL — SIGNIFICANT CHANGE UP (ref 0–0.5)
EOSINOPHIL NFR BLD AUTO: 0.7 % — SIGNIFICANT CHANGE UP (ref 0–6)
GLUCOSE SERPL-MCNC: 98 MG/DL — SIGNIFICANT CHANGE UP (ref 70–99)
HCT VFR BLD CALC: 44.4 % — SIGNIFICANT CHANGE UP (ref 39–50)
HGB BLD-MCNC: 15.1 G/DL — SIGNIFICANT CHANGE UP (ref 13–17)
IMM GRANULOCYTES NFR BLD AUTO: 0.4 % — SIGNIFICANT CHANGE UP (ref 0–1.5)
LYMPHOCYTES # BLD AUTO: 1.07 K/UL — SIGNIFICANT CHANGE UP (ref 1–3.3)
LYMPHOCYTES # BLD AUTO: 19.5 % — SIGNIFICANT CHANGE UP (ref 13–44)
MCHC RBC-ENTMCNC: 32.1 PG — SIGNIFICANT CHANGE UP (ref 27–34)
MCHC RBC-ENTMCNC: 34 GM/DL — SIGNIFICANT CHANGE UP (ref 32–36)
MCV RBC AUTO: 94.5 FL — SIGNIFICANT CHANGE UP (ref 80–100)
MONOCYTES # BLD AUTO: 0.4 K/UL — SIGNIFICANT CHANGE UP (ref 0–0.9)
MONOCYTES NFR BLD AUTO: 7.3 % — SIGNIFICANT CHANGE UP (ref 2–14)
NEUTROPHILS # BLD AUTO: 3.95 K/UL — SIGNIFICANT CHANGE UP (ref 1.8–7.4)
NEUTROPHILS NFR BLD AUTO: 71.7 % — SIGNIFICANT CHANGE UP (ref 43–77)
NRBC # BLD: 0 /100 WBCS — SIGNIFICANT CHANGE UP (ref 0–0)
PLATELET # BLD AUTO: 147 K/UL — LOW (ref 150–400)
POTASSIUM SERPL-MCNC: 4 MMOL/L — SIGNIFICANT CHANGE UP (ref 3.5–5.3)
POTASSIUM SERPL-SCNC: 4 MMOL/L — SIGNIFICANT CHANGE UP (ref 3.5–5.3)
RBC # BLD: 4.7 M/UL — SIGNIFICANT CHANGE UP (ref 4.2–5.8)
RBC # FLD: 12.2 % — SIGNIFICANT CHANGE UP (ref 10.3–14.5)
SODIUM SERPL-SCNC: 140 MMOL/L — SIGNIFICANT CHANGE UP (ref 135–145)
WBC # BLD: 5.5 K/UL — SIGNIFICANT CHANGE UP (ref 3.8–10.5)
WBC # FLD AUTO: 5.5 K/UL — SIGNIFICANT CHANGE UP (ref 3.8–10.5)

## 2021-05-11 PROCEDURE — 93010 ELECTROCARDIOGRAM REPORT: CPT

## 2021-05-11 RX ORDER — DOXAZOSIN MESYLATE 4 MG
1 TABLET ORAL
Qty: 0 | Refills: 0 | DISCHARGE

## 2021-05-11 NOTE — H&P PST ADULT - HISTORY OF PRESENT ILLNESS
61 yo male c/o bilateral groin pain secondary to bilateral inguinal hernia- scheduled for bilateral inguinal hernia repair    denies recent travels in the past 30 days. No fever, SOB, cough, flu like symptoms or body rash- covid screen

## 2021-05-11 NOTE — H&P PST ADULT - ASSESSMENT
bilateral inguinal hernia  CAPRINI SCORE    AGE RELATED RISK FACTORS                                                       MOBILITY RELATED FACTORS  [ ] Age 41-60 years                                            (1 Point)                  [ ] Bed rest                                                        (1 Point)  [x ] Age: 61-74 years                                           (2 Points)                [ ] Plaster cast                                                   (2 Points)  [ ] Age= 75 years                                              (3 Points)                 [ ] Bed bound for more than 72 hours                   (2 Points)    DISEASE RELATED RISK FACTORS                                               GENDER SPECIFIC FACTORS  [ ] Edema in the lower extremities                       (1 Point)                  [ ] Pregnancy                                                     (1 Point)  [ ] Varicose veins                                               (1 Point)                  [ ] Post-partum < 6 weeks                                   (1 Point)             [ x] BMI > 25 Kg/m2                                            (1 Point)                  [ ] Hormonal therapy  or oral contraception            (1 Point)                 [ ] Sepsis (in the previous month)                        (1 Point)                  [ ] History of pregnancy complications  [ ] Pneumonia or serious lung disease                                               [ ] Unexplained or recurrent                       (1 Point)           (in the previous month)                               (1 Point)  [ ] Abnormal pulmonary function test                     (1 Point)                 SURGERY RELATED RISK FACTORS  [ ] Acute myocardial infarction                              (1 Point)                 [ ]  Section                                            (1 Point)  [ ] Congestive heart failure (in the previous month)  (1 Point)                 [ ] Minor surgery                                                 (1 Point)   [ ] Inflammatory bowel disease                             (1 Point)                 [ ] Arthroscopic surgery                                        (2 Points)  [ ] Central venous access                                    (2 Points)                [x ] General surgery lasting more than 45 minutes   (2 Points)       [ ] Stroke (in the previous month)                          (5 Points)               [ ] Elective arthroplasty                                        (5 Points)                                                                                                                                               HEMATOLOGY RELATED FACTORS                                                 TRAUMA RELATED RISK FACTORS  [ ] Prior episodes of VTE                                     (3 Points)                 [ ] Fracture of the hip, pelvis, or leg                       (5 Points)  [ ] Positive family history for VTE                         (3 Points)                 [ ] Acute spinal cord injury (in the previous month)  (5 Points)  [ ] Prothrombin 71217 A                                      (3 Points)                 [ ] Paralysis  (less than 1 month)                          (5 Points)  [ ] Factor V Leiden                                             (3 Points)                 [ ] Multiple Trauma within 1 month                         (5 Points)  [ ] Lupus anticoagulants                                     (3 Points)                                                           [ ] Anticardiolipin antibodies                                (3 Points)                                                       [ ] High homocysteine in the blood                      (3 Points)                                             [ ] Other congenital or acquired thrombophilia       (3 Points)                                                [ ] Heparin induced thrombocytopenia                  (3 Points)                                          Total Score [       5   ]  Caprini Score 0-2: Low risk, No VTE Prophylaxis required for most patient's, encourage ambulation  Caprini Score 3-6: At Risk, Pharmacologic VTE prophylaxis is indicated for most patients ( in the absence of a contraindication)  Caprini Score Greater than or = 7: High Risk , pharmacologic VTE is indicated for most patients ( in the absence of a contraindication)    Caprini score indicates that the patient is high risk for VTE event ( score 6 or greater). Surgical patient's in this group will benefit from both pharmacologic prophylaxis and intermittent compression devices . Surgical team will determine the balance between VTE  risk and bleeding risk and other clinical considerations

## 2021-05-11 NOTE — H&P PST ADULT - NSICDXPROBLEM_GEN_ALL_CORE_FT
PROBLEM DIAGNOSES  Problem: Bilateral inguinal hernia  Assessment and Plan: schedule dfor bilateral inguinal hernia repair    Problem: HTN (hypertension)  Assessment and Plan: continue meds      Problem: Preoperative examination  Assessment and Plan: Preop instructions provided including NPO status. Hibiclens wash for infection control. Patient aware to stop NSAID, OTC herbals  for 7-10 days, needs to be accoumpanied by adult upon discharge.  Patient verbalized understanding  patient instructed on having covid19 swab 3 days prior to surgery

## 2021-05-11 NOTE — H&P PST ADULT - NSICDXPASTSURGICALHX_GEN_ALL_CORE_FT
PAST SURGICAL HISTORY:  H/O fracture of right hip ORIF right [id number]  titanium plate    H/O lithotripsy March 2020

## 2021-05-12 ENCOUNTER — APPOINTMENT (OUTPATIENT)
Dept: SURGERY | Facility: CLINIC | Age: 61
End: 2021-05-12

## 2021-05-14 DIAGNOSIS — Z01.818 ENCOUNTER FOR OTHER PREPROCEDURAL EXAMINATION: ICD-10-CM

## 2021-05-15 ENCOUNTER — APPOINTMENT (OUTPATIENT)
Dept: DISASTER EMERGENCY | Facility: CLINIC | Age: 61
End: 2021-05-15

## 2021-05-16 LAB — SARS-COV-2 N GENE NPH QL NAA+PROBE: NOT DETECTED

## 2021-05-17 ENCOUNTER — TRANSCRIPTION ENCOUNTER (OUTPATIENT)
Age: 61
End: 2021-05-17

## 2021-05-18 ENCOUNTER — APPOINTMENT (OUTPATIENT)
Dept: SURGERY | Facility: HOSPITAL | Age: 61
End: 2021-05-18

## 2021-05-18 ENCOUNTER — OUTPATIENT (OUTPATIENT)
Dept: OUTPATIENT SERVICES | Facility: HOSPITAL | Age: 61
LOS: 1 days | Discharge: HOME | End: 2021-05-18
Payer: COMMERCIAL

## 2021-05-18 VITALS
OXYGEN SATURATION: 98 % | DIASTOLIC BLOOD PRESSURE: 86 MMHG | TEMPERATURE: 99 F | SYSTOLIC BLOOD PRESSURE: 172 MMHG | RESPIRATION RATE: 17 BRPM | WEIGHT: 153 LBS | HEIGHT: 67 IN | HEART RATE: 59 BPM

## 2021-05-18 VITALS
OXYGEN SATURATION: 95 % | HEART RATE: 92 BPM | DIASTOLIC BLOOD PRESSURE: 77 MMHG | SYSTOLIC BLOOD PRESSURE: 116 MMHG | RESPIRATION RATE: 15 BRPM

## 2021-05-18 DIAGNOSIS — Z87.81 PERSONAL HISTORY OF (HEALED) TRAUMATIC FRACTURE: Chronic | ICD-10-CM

## 2021-05-18 DIAGNOSIS — Z98.890 OTHER SPECIFIED POSTPROCEDURAL STATES: Chronic | ICD-10-CM

## 2021-05-18 PROCEDURE — 49650 LAP ING HERNIA REPAIR INIT: CPT | Mod: 50

## 2021-05-18 PROCEDURE — 49650 LAP ING HERNIA REPAIR INIT: CPT | Mod: AS,50

## 2021-05-18 RX ORDER — HYDROMORPHONE HYDROCHLORIDE 2 MG/ML
1 INJECTION INTRAMUSCULAR; INTRAVENOUS; SUBCUTANEOUS
Refills: 0 | Status: DISCONTINUED | OUTPATIENT
Start: 2021-05-18 | End: 2021-05-18

## 2021-05-18 RX ORDER — HYDROMORPHONE HYDROCHLORIDE 2 MG/ML
0.25 INJECTION INTRAMUSCULAR; INTRAVENOUS; SUBCUTANEOUS
Refills: 0 | Status: DISCONTINUED | OUTPATIENT
Start: 2021-05-18 | End: 2021-05-18

## 2021-05-18 RX ORDER — SODIUM CHLORIDE 9 MG/ML
1000 INJECTION, SOLUTION INTRAVENOUS
Refills: 0 | Status: DISCONTINUED | OUTPATIENT
Start: 2021-05-18 | End: 2021-05-18

## 2021-05-18 RX ORDER — ONDANSETRON 8 MG/1
4 TABLET, FILM COATED ORAL ONCE
Refills: 0 | Status: DISCONTINUED | OUTPATIENT
Start: 2021-05-18 | End: 2021-05-18

## 2021-05-18 RX ORDER — SODIUM CHLORIDE 9 MG/ML
3 INJECTION INTRAMUSCULAR; INTRAVENOUS; SUBCUTANEOUS EVERY 8 HOURS
Refills: 0 | Status: DISCONTINUED | OUTPATIENT
Start: 2021-05-18 | End: 2021-05-18

## 2021-05-18 RX ADMIN — HYDROMORPHONE HYDROCHLORIDE 0.25 MILLIGRAM(S): 2 INJECTION INTRAMUSCULAR; INTRAVENOUS; SUBCUTANEOUS at 15:00

## 2021-05-18 RX ADMIN — SODIUM CHLORIDE 3 MILLILITER(S): 9 INJECTION INTRAMUSCULAR; INTRAVENOUS; SUBCUTANEOUS at 13:21

## 2021-05-18 RX ADMIN — SODIUM CHLORIDE 100 MILLILITER(S): 9 INJECTION, SOLUTION INTRAVENOUS at 14:47

## 2021-05-18 NOTE — BRIEF OPERATIVE NOTE - NSICDXBRIEFPROCEDURE_GEN_ALL_CORE_FT
PROCEDURES:  Laparoscopic herniorrhaphy of bilateral inguinal hernias with graft 18-May-2021 14:44:06  Chapo Morales

## 2021-05-26 DIAGNOSIS — I10 ESSENTIAL (PRIMARY) HYPERTENSION: ICD-10-CM

## 2021-05-26 DIAGNOSIS — K40.00 BILATERAL INGUINAL HERNIA, WITH OBSTRUCTION, WITHOUT GANGRENE, NOT SPECIFIED AS RECURRENT: ICD-10-CM

## 2021-05-26 DIAGNOSIS — Z79.899 OTHER LONG TERM (CURRENT) DRUG THERAPY: ICD-10-CM

## 2021-05-26 DIAGNOSIS — K41.90 UNILATERAL FEMORAL HERNIA, WITHOUT OBSTRUCTION OR GANGRENE, NOT SPECIFIED AS RECURRENT: ICD-10-CM

## 2021-06-20 NOTE — ED ADULT NURSE NOTE - NS ED NURSE RECORD ANOTHER HT AND WT
[de-identified] : Here for follow up\par \par She states she has been feeling well\par \par She states she is going out and walking.  She has lost 15 lbs\par \par She continues to follow up with psychiatrist and therapist\par \par She reports she recently has been having some itching underneath her right breast.  She states she has scratched this area so hard that she has wounded area.  She would like the area examined to make sure it is not infected.  She denies feeling any breast lumps Yes

## 2021-09-10 ENCOUNTER — EMERGENCY (EMERGENCY)
Facility: HOSPITAL | Age: 61
LOS: 1 days | Discharge: HOME | End: 2021-09-10
Attending: EMERGENCY MEDICINE
Payer: COMMERCIAL

## 2021-09-10 VITALS
SYSTOLIC BLOOD PRESSURE: 152 MMHG | RESPIRATION RATE: 18 BRPM | HEART RATE: 83 BPM | TEMPERATURE: 98 F | OXYGEN SATURATION: 99 % | DIASTOLIC BLOOD PRESSURE: 82 MMHG

## 2021-09-10 VITALS
DIASTOLIC BLOOD PRESSURE: 76 MMHG | SYSTOLIC BLOOD PRESSURE: 127 MMHG | HEART RATE: 80 BPM | TEMPERATURE: 98 F | HEIGHT: 67 IN | RESPIRATION RATE: 18 BRPM | OXYGEN SATURATION: 98 % | WEIGHT: 160.06 LBS

## 2021-09-10 DIAGNOSIS — Z98.890 OTHER SPECIFIED POSTPROCEDURAL STATES: Chronic | ICD-10-CM

## 2021-09-10 DIAGNOSIS — Z87.81 PERSONAL HISTORY OF (HEALED) TRAUMATIC FRACTURE: Chronic | ICD-10-CM

## 2021-09-10 LAB
ALBUMIN SERPL ELPH-MCNC: 4.1 G/DL — SIGNIFICANT CHANGE UP (ref 3.5–5)
ALP SERPL-CCNC: 68 U/L — SIGNIFICANT CHANGE UP (ref 40–120)
ALT FLD-CCNC: 27 U/L DA — SIGNIFICANT CHANGE UP (ref 10–60)
ANION GAP SERPL CALC-SCNC: 6 MMOL/L — SIGNIFICANT CHANGE UP (ref 5–17)
AST SERPL-CCNC: 16 U/L — SIGNIFICANT CHANGE UP (ref 10–40)
BASOPHILS # BLD AUTO: 0.02 K/UL — SIGNIFICANT CHANGE UP (ref 0–0.2)
BASOPHILS NFR BLD AUTO: 0.3 % — SIGNIFICANT CHANGE UP (ref 0–2)
BILIRUB SERPL-MCNC: 0.8 MG/DL — SIGNIFICANT CHANGE UP (ref 0.2–1.2)
BUN SERPL-MCNC: 15 MG/DL — SIGNIFICANT CHANGE UP (ref 7–18)
CALCIUM SERPL-MCNC: 8.6 MG/DL — SIGNIFICANT CHANGE UP (ref 8.4–10.5)
CHLORIDE SERPL-SCNC: 105 MMOL/L — SIGNIFICANT CHANGE UP (ref 96–108)
CK SERPL-CCNC: 83 U/L — SIGNIFICANT CHANGE UP (ref 35–232)
CO2 SERPL-SCNC: 30 MMOL/L — SIGNIFICANT CHANGE UP (ref 22–31)
CREAT SERPL-MCNC: 0.92 MG/DL — SIGNIFICANT CHANGE UP (ref 0.5–1.3)
EOSINOPHIL # BLD AUTO: 0.03 K/UL — SIGNIFICANT CHANGE UP (ref 0–0.5)
EOSINOPHIL NFR BLD AUTO: 0.5 % — SIGNIFICANT CHANGE UP (ref 0–6)
GLUCOSE SERPL-MCNC: 191 MG/DL — HIGH (ref 70–99)
HCT VFR BLD CALC: 43 % — SIGNIFICANT CHANGE UP (ref 39–50)
HGB BLD-MCNC: 14.8 G/DL — SIGNIFICANT CHANGE UP (ref 13–17)
IMM GRANULOCYTES NFR BLD AUTO: 0.5 % — SIGNIFICANT CHANGE UP (ref 0–1.5)
LYMPHOCYTES # BLD AUTO: 0.67 K/UL — LOW (ref 1–3.3)
LYMPHOCYTES # BLD AUTO: 11.5 % — LOW (ref 13–44)
MCHC RBC-ENTMCNC: 32.2 PG — SIGNIFICANT CHANGE UP (ref 27–34)
MCHC RBC-ENTMCNC: 34.4 GM/DL — SIGNIFICANT CHANGE UP (ref 32–36)
MCV RBC AUTO: 93.5 FL — SIGNIFICANT CHANGE UP (ref 80–100)
MONOCYTES # BLD AUTO: 0.26 K/UL — SIGNIFICANT CHANGE UP (ref 0–0.9)
MONOCYTES NFR BLD AUTO: 4.5 % — SIGNIFICANT CHANGE UP (ref 2–14)
NEUTROPHILS # BLD AUTO: 4.81 K/UL — SIGNIFICANT CHANGE UP (ref 1.8–7.4)
NEUTROPHILS NFR BLD AUTO: 82.7 % — HIGH (ref 43–77)
NRBC # BLD: 0 /100 WBCS — SIGNIFICANT CHANGE UP (ref 0–0)
PLATELET # BLD AUTO: 137 K/UL — LOW (ref 150–400)
POTASSIUM SERPL-MCNC: 3.7 MMOL/L — SIGNIFICANT CHANGE UP (ref 3.5–5.3)
POTASSIUM SERPL-SCNC: 3.7 MMOL/L — SIGNIFICANT CHANGE UP (ref 3.5–5.3)
PROT SERPL-MCNC: 7.3 G/DL — SIGNIFICANT CHANGE UP (ref 6–8.3)
RBC # BLD: 4.6 M/UL — SIGNIFICANT CHANGE UP (ref 4.2–5.8)
RBC # FLD: 12.4 % — SIGNIFICANT CHANGE UP (ref 10.3–14.5)
SODIUM SERPL-SCNC: 141 MMOL/L — SIGNIFICANT CHANGE UP (ref 135–145)
TROPONIN I SERPL-MCNC: <0.015 NG/ML — SIGNIFICANT CHANGE UP (ref 0–0.04)
WBC # BLD: 5.82 K/UL — SIGNIFICANT CHANGE UP (ref 3.8–10.5)
WBC # FLD AUTO: 5.82 K/UL — SIGNIFICANT CHANGE UP (ref 3.8–10.5)

## 2021-09-10 PROCEDURE — 82550 ASSAY OF CK (CPK): CPT

## 2021-09-10 PROCEDURE — 85025 COMPLETE CBC W/AUTO DIFF WBC: CPT

## 2021-09-10 PROCEDURE — 80053 COMPREHEN METABOLIC PANEL: CPT

## 2021-09-10 PROCEDURE — 99283 EMERGENCY DEPT VISIT LOW MDM: CPT | Mod: 25

## 2021-09-10 PROCEDURE — 93005 ELECTROCARDIOGRAM TRACING: CPT

## 2021-09-10 PROCEDURE — 36415 COLL VENOUS BLD VENIPUNCTURE: CPT

## 2021-09-10 PROCEDURE — 93010 ELECTROCARDIOGRAM REPORT: CPT

## 2021-09-10 PROCEDURE — 99285 EMERGENCY DEPT VISIT HI MDM: CPT

## 2021-09-10 PROCEDURE — 84484 ASSAY OF TROPONIN QUANT: CPT

## 2021-09-10 RX ORDER — SODIUM CHLORIDE 9 MG/ML
1000 INJECTION INTRAMUSCULAR; INTRAVENOUS; SUBCUTANEOUS ONCE
Refills: 0 | Status: COMPLETED | OUTPATIENT
Start: 2021-09-10 | End: 2021-09-10

## 2021-09-10 RX ADMIN — SODIUM CHLORIDE 1000 MILLILITER(S): 9 INJECTION INTRAMUSCULAR; INTRAVENOUS; SUBCUTANEOUS at 14:21

## 2021-09-10 NOTE — ED PROVIDER NOTE - NSICDXPASTSURGICALHX_GEN_ALL_CORE_FT
PAST SURGICAL HISTORY:  H/O fracture of right hip ORIF right [id number]  titanium plate    H/O lithotripsy March 2020

## 2021-09-10 NOTE — ED PROVIDER NOTE - OBJECTIVE STATEMENT
60 y.o male with a history of HLD and HTN presents to the ED after a fainting episode at a restaurant today. Patient was eating dessert then felt dizzy, hot, and sweaty so he asked for an ambulance and then fainted. EMS reports patient's blood pressure was low. Patient describes feeling a sharp pinching left sided chest pain last night which felt like abdominal gas, which he gets from time to time. Patient woke up this morning with an urge for a bowel movement. Patient states this happened years ago and was told it was a vasovagal episode. Patient denies any other complaints. NKDA. 60 y.o male with a history of HLD and HTN presents to the ED after a fainting episode at a restaurant today. Patient was eating dessert then felt dizzy, hot, and sweaty so he asked for an ambulance and then fainted. EMS reports patient's blood pressure was low. Patient describes feeling a sharp pinching left sided chest pain last night which felt like abdominal gas, which he gets from time to time. Patient woke up after fainting with an urge for a bowel movement. Patient states this happened years ago and was told it was a vasovagal episode. Patient denies any other complaints. NKDA.

## 2021-09-10 NOTE — ED ADULT NURSE NOTE - OBJECTIVE STATEMENT
Patient presents to ED with report of syncope at restaurant. He states that he was eating dessert at table when he fainted for a few seconds. Denies hitting head. States he has no chest pain, dizziness, no nausea, no vomiting at present moment. No acute distress noted.

## 2021-09-10 NOTE — ED ADULT NURSE NOTE - NSICDXPASTSURGICALHX_GEN_ALL_CORE_FT
PAST SURGICAL HISTORY:  H/O fracture of right hip ORIF right [id number]  titanium plate    H/O lithotripsy March 2020

## 2021-09-10 NOTE — ED PROVIDER NOTE - NSFOLLOWUPINSTRUCTIONS_ED_ALL_ED_FT
Syncope       Syncope refers to a condition in which a person temporarily loses consciousness. Syncope may also be called fainting or passing out. It is caused by a sudden decrease in blood flow to the brain. Even though most causes of syncope are not dangerous, syncope can be a sign of a serious medical problem. Your health care provider may do tests to find the reason why you are having syncope.  Signs that you may be about to faint include:  •Feeling dizzy or light-headed.      •Feeling nauseous.      •Seeing all white or all black in your field of vision.      •Having cold, clammy skin.      If you faint, get medical help right away. Call your local emergency services (911 in the [place] Do not drive yourself to the hospital.      Follow these instructions at home:    Pay attention to any changes in your symptoms. Take these actions to stay safe and to help relieve your symptoms:    Lifestyle     • Do not drive, use machinery, or play sports until your health care provider says it is okay.      • Do not drink alcohol.      • Do not use any products that contain nicotine or tobacco, such as cigarettes and e-cigarettes. If you need help quitting, ask your health care provider.      •Drink enough fluid to keep your urine pale yellow.      General instructions     •Take over-the-counter and prescription medicines only as told by your health care provider.      •If you are taking blood pressure or heart medicine, get up slowly and take several minutes to sit and then stand. This can reduce dizziness or light-headedness.      •Have someone stay with you until you feel stable.      •If you start to feel like you might faint, lie down right away and raise (elevate) your feet above the level of your heart. Breathe deeply and steadily. Wait until all the symptoms have passed.      •Keep all follow-up visits as told by your health care provider. This is important.        Get help right away if you:    •Have a severe headache.      •Faint once or repeatedly.      •Have pain in your chest, abdomen, or back.      •Have a very fast or irregular heartbeat (palpitations).      •Have pain when you breathe.      •Are bleeding from your mouth or rectum, or you have black or tarry stool.      •Have a seizure.      •Are confused.      •Have trouble walking.      •Have severe weakness.      •Have vision problems.      These symptoms may represent a serious problem that is an emergency. Do not wait to see if your symptoms will go away. Get medical help right away. Call your local emergency services (911 in the [place] Do not drive yourself to the hospital.       Summary    •Syncope refers to a condition in which a person temporarily loses consciousness. It is caused by a sudden decrease in blood flow to the brain.      •Signs that you may be about to faint include dizziness, feeling light-headed, feeling nauseous, sudden vision changes, or cold, clammy skin.      •Although most causes of syncope are not dangerous, syncope can be a sign of a serious medical problem. If you faint, get medical help right away.      This information is not intended to replace advice given to you by your health care provider. Make sure you discuss any questions you have with your health care provider.

## 2021-09-10 NOTE — ED PROVIDER NOTE - PATIENT PORTAL LINK FT
You can access the FollowMyHealth Patient Portal offered by [facility] by registering at the following website: [email or web address]. By joining [employer]’s FollowMyHealth portal, you will also be able to view your health information using other applications (apps) compatible with our system.

## 2022-01-04 ENCOUNTER — INPATIENT (INPATIENT)
Age: 62
LOS: 5 days | Discharge: HOME | End: 2022-01-10
Attending: INTERNAL MEDICINE | Admitting: INTERNAL MEDICINE
Payer: COMMERCIAL

## 2022-01-04 VITALS
SYSTOLIC BLOOD PRESSURE: 157 MMHG | DIASTOLIC BLOOD PRESSURE: 97 MMHG | OXYGEN SATURATION: 97 % | HEART RATE: 99 BPM | TEMPERATURE: 98 F | HEIGHT: 67 IN | WEIGHT: 153.44 LBS | RESPIRATION RATE: 20 BRPM

## 2022-01-04 DIAGNOSIS — Z29.9 ENCOUNTER FOR PROPHYLACTIC MEASURES, UNSPECIFIED: ICD-10-CM

## 2022-01-04 DIAGNOSIS — Z98.890 OTHER SPECIFIED POSTPROCEDURAL STATES: Chronic | ICD-10-CM

## 2022-01-04 DIAGNOSIS — U07.1 COVID-19: ICD-10-CM

## 2022-01-04 DIAGNOSIS — Z87.81 PERSONAL HISTORY OF (HEALED) TRAUMATIC FRACTURE: Chronic | ICD-10-CM

## 2022-01-04 DIAGNOSIS — I10 ESSENTIAL (PRIMARY) HYPERTENSION: ICD-10-CM

## 2022-01-04 DIAGNOSIS — J96.01 ACUTE RESPIRATORY FAILURE WITH HYPOXIA: ICD-10-CM

## 2022-01-04 LAB
ALBUMIN SERPL ELPH-MCNC: 4.1 G/DL — SIGNIFICANT CHANGE UP (ref 3.3–5)
ALP SERPL-CCNC: 73 U/L — SIGNIFICANT CHANGE UP (ref 40–120)
ALT FLD-CCNC: 22 U/L — SIGNIFICANT CHANGE UP (ref 4–41)
ANION GAP SERPL CALC-SCNC: 12 MMOL/L — SIGNIFICANT CHANGE UP (ref 7–14)
AST SERPL-CCNC: 37 U/L — SIGNIFICANT CHANGE UP (ref 4–40)
BASOPHILS # BLD AUTO: 0 K/UL — SIGNIFICANT CHANGE UP (ref 0–0.2)
BASOPHILS NFR BLD AUTO: 0 % — SIGNIFICANT CHANGE UP (ref 0–2)
BILIRUB SERPL-MCNC: 0.5 MG/DL — SIGNIFICANT CHANGE UP (ref 0.2–1.2)
BUN SERPL-MCNC: 9 MG/DL — SIGNIFICANT CHANGE UP (ref 7–23)
CALCIUM SERPL-MCNC: 8.8 MG/DL — SIGNIFICANT CHANGE UP (ref 8.4–10.5)
CHLORIDE SERPL-SCNC: 102 MMOL/L — SIGNIFICANT CHANGE UP (ref 98–107)
CO2 SERPL-SCNC: 25 MMOL/L — SIGNIFICANT CHANGE UP (ref 22–31)
CREAT SERPL-MCNC: 0.74 MG/DL — SIGNIFICANT CHANGE UP (ref 0.5–1.3)
CRP SERPL-MCNC: 67.7 MG/L — HIGH
D DIMER BLD IA.RAPID-MCNC: 253 NG/ML DDU — HIGH
EOSINOPHIL # BLD AUTO: 0 K/UL — SIGNIFICANT CHANGE UP (ref 0–0.5)
EOSINOPHIL NFR BLD AUTO: 0 % — SIGNIFICANT CHANGE UP (ref 0–6)
FERRITIN SERPL-MCNC: 428 NG/ML — HIGH (ref 30–400)
GLUCOSE SERPL-MCNC: 113 MG/DL — HIGH (ref 70–99)
HCT VFR BLD CALC: 42.3 % — SIGNIFICANT CHANGE UP (ref 39–50)
HGB BLD-MCNC: 14.7 G/DL — SIGNIFICANT CHANGE UP (ref 13–17)
IANC: 3.48 K/UL — SIGNIFICANT CHANGE UP (ref 1.5–8.5)
IMM GRANULOCYTES NFR BLD AUTO: 0.7 % — SIGNIFICANT CHANGE UP (ref 0–1.5)
LYMPHOCYTES # BLD AUTO: 0.4 K/UL — LOW (ref 1–3.3)
LYMPHOCYTES # BLD AUTO: 9.4 % — LOW (ref 13–44)
MCHC RBC-ENTMCNC: 32 PG — SIGNIFICANT CHANGE UP (ref 27–34)
MCHC RBC-ENTMCNC: 34.8 GM/DL — SIGNIFICANT CHANGE UP (ref 32–36)
MCV RBC AUTO: 92 FL — SIGNIFICANT CHANGE UP (ref 80–100)
MONOCYTES # BLD AUTO: 0.33 K/UL — SIGNIFICANT CHANGE UP (ref 0–0.9)
MONOCYTES NFR BLD AUTO: 7.8 % — SIGNIFICANT CHANGE UP (ref 2–14)
NEUTROPHILS # BLD AUTO: 3.48 K/UL — SIGNIFICANT CHANGE UP (ref 1.8–7.4)
NEUTROPHILS NFR BLD AUTO: 82.1 % — HIGH (ref 43–77)
NRBC # BLD: 0 /100 WBCS — SIGNIFICANT CHANGE UP
NRBC # FLD: 0 K/UL — SIGNIFICANT CHANGE UP
PLATELET # BLD AUTO: 185 K/UL — SIGNIFICANT CHANGE UP (ref 150–400)
POTASSIUM SERPL-MCNC: 3.7 MMOL/L — SIGNIFICANT CHANGE UP (ref 3.5–5.3)
POTASSIUM SERPL-SCNC: 3.7 MMOL/L — SIGNIFICANT CHANGE UP (ref 3.5–5.3)
PROCALCITONIN SERPL-MCNC: 0.08 NG/ML — SIGNIFICANT CHANGE UP (ref 0.02–0.1)
PROT SERPL-MCNC: 7 G/DL — SIGNIFICANT CHANGE UP (ref 6–8.3)
RBC # BLD: 4.6 M/UL — SIGNIFICANT CHANGE UP (ref 4.2–5.8)
RBC # FLD: 12.6 % — SIGNIFICANT CHANGE UP (ref 10.3–14.5)
SARS-COV-2 RNA SPEC QL NAA+PROBE: DETECTED
SODIUM SERPL-SCNC: 139 MMOL/L — SIGNIFICANT CHANGE UP (ref 135–145)
WBC # BLD: 4.24 K/UL — SIGNIFICANT CHANGE UP (ref 3.8–10.5)
WBC # FLD AUTO: 4.24 K/UL — SIGNIFICANT CHANGE UP (ref 3.8–10.5)

## 2022-01-04 PROCEDURE — 71045 X-RAY EXAM CHEST 1 VIEW: CPT | Mod: 26

## 2022-01-04 PROCEDURE — 99285 EMERGENCY DEPT VISIT HI MDM: CPT | Mod: 25

## 2022-01-04 PROCEDURE — 99223 1ST HOSP IP/OBS HIGH 75: CPT

## 2022-01-04 PROCEDURE — 93010 ELECTROCARDIOGRAM REPORT: CPT

## 2022-01-04 RX ORDER — ACETAMINOPHEN 500 MG
650 TABLET ORAL EVERY 6 HOURS
Refills: 0 | Status: DISCONTINUED | OUTPATIENT
Start: 2022-01-04 | End: 2022-01-10

## 2022-01-04 RX ORDER — DEXAMETHASONE 0.5 MG/5ML
6 ELIXIR ORAL ONCE
Refills: 0 | Status: COMPLETED | OUTPATIENT
Start: 2022-01-04 | End: 2022-01-04

## 2022-01-04 RX ORDER — REMDESIVIR 5 MG/ML
100 INJECTION INTRAVENOUS EVERY 24 HOURS
Refills: 0 | Status: COMPLETED | OUTPATIENT
Start: 2022-01-05 | End: 2022-01-08

## 2022-01-04 RX ORDER — AMLODIPINE BESYLATE 2.5 MG/1
2 TABLET ORAL
Qty: 0 | Refills: 0 | DISCHARGE

## 2022-01-04 RX ORDER — ENOXAPARIN SODIUM 100 MG/ML
40 INJECTION SUBCUTANEOUS DAILY
Refills: 0 | Status: DISCONTINUED | OUTPATIENT
Start: 2022-01-04 | End: 2022-01-10

## 2022-01-04 RX ORDER — REMDESIVIR 5 MG/ML
200 INJECTION INTRAVENOUS EVERY 24 HOURS
Refills: 0 | Status: COMPLETED | OUTPATIENT
Start: 2022-01-04 | End: 2022-01-04

## 2022-01-04 RX ORDER — LANOLIN ALCOHOL/MO/W.PET/CERES
3 CREAM (GRAM) TOPICAL AT BEDTIME
Refills: 0 | Status: DISCONTINUED | OUTPATIENT
Start: 2022-01-04 | End: 2022-01-06

## 2022-01-04 RX ORDER — AMLODIPINE BESYLATE 2.5 MG/1
5 TABLET ORAL DAILY
Refills: 0 | Status: DISCONTINUED | OUTPATIENT
Start: 2022-01-04 | End: 2022-01-10

## 2022-01-04 RX ORDER — AMLODIPINE BESYLATE 2.5 MG/1
1 TABLET ORAL
Qty: 0 | Refills: 0 | DISCHARGE

## 2022-01-04 RX ORDER — REMDESIVIR 5 MG/ML
INJECTION INTRAVENOUS
Refills: 0 | Status: COMPLETED | OUTPATIENT
Start: 2022-01-04 | End: 2022-01-08

## 2022-01-04 RX ORDER — DEXAMETHASONE 0.5 MG/5ML
6 ELIXIR ORAL DAILY
Refills: 0 | Status: DISCONTINUED | OUTPATIENT
Start: 2022-01-05 | End: 2022-01-10

## 2022-01-04 RX ADMIN — AMLODIPINE BESYLATE 5 MILLIGRAM(S): 2.5 TABLET ORAL at 19:55

## 2022-01-04 RX ADMIN — REMDESIVIR 500 MILLIGRAM(S): 5 INJECTION INTRAVENOUS at 17:28

## 2022-01-04 RX ADMIN — Medication 600 MILLIGRAM(S): at 18:52

## 2022-01-04 RX ADMIN — Medication 6 MILLIGRAM(S): at 09:35

## 2022-01-04 RX ADMIN — Medication 30 MILLILITER(S): at 17:30

## 2022-01-04 NOTE — ED PROVIDER NOTE - NSICDXPASTSURGICALHX_GEN_ALL_CORE_FT
PAST SURGICAL HISTORY:  H/O fracture of right hip ORIF right [id number]  titanium plate    H/O lithotripsy March 2020

## 2022-01-04 NOTE — H&P ADULT - PROBLEM SELECTOR PLAN 1
2/2 COVID PNA, requiring 2L NC. D- dimer 253, procal low. CXray with bilateral infiltrates  - Start Remdesivir   - Cont Dexamethasone   - Lovenox 40   - IS, Mucinex, Chest PT, self prone   - Encouraged patient to get vaccinated after acute illness

## 2022-01-04 NOTE — ED PROVIDER NOTE - ATTENDING CONTRIBUTION TO CARE
I have personally performed a face to face medical and diagnostic evaluation of the patient. I have discussed with and reviewed the ACP's note and agree with the History, ROS, Physical Exam and MDM unless otherwise indicated. A brief summary of my personal evaluation and impression can be found below.    61M hx of HTN not covid vaccinated presents with a cc of body aches cough sob fatigue worsening x12 days took home test kit and was covid positive, + SOB + ROBERTS. No CP. Feels like is getting more fatigued which is prompting visit. Denies n/v/f/c/cp/sob. Denies headache, syncope, lightheadedness, dizziness. Denies chest palpitations, abdominal pain. Denies edema. Denies dysuria, hematuria, BRBPR, tarry stools, diarrhea, constipation.     All other ROS negative, except as above and as per HPI and ROS section.    VITALS: Initial triage and subsequent vitals have been reviewed by me.  GEN APPEARANCE: WDWN, alert, non-toxic, NAD  HEAD: Atraumatic.  EYES: PERRLa, EOMI, vision grossly intact.   NECK: Supple  CV: RRR, S1S2, no c/r/m/g. Cap refill <2 seconds. No bruits.   LUNGS: b/l crackles   ABDOMEN: Soft, NTND. No guarding or rebound.   MSK/EXT: No spinal or extremity point tenderness. No CVA ttp. Pelvis stable. No peripheral edema.  NEURO: Alert, follows commands. Weight bearing normal. Speech normal. Sensation and motor normal x4 extremities.   SKIN: Warm, dry and intact. No rash.  PSYCH: Appropriate    Plan/MDM: 61M hx of HTN not covid vaccinated presents with a cc of body aches cough sob fatigue worsening x12 days took home test kit and was covid positive, + SOB + ROBERTS. No CP. Feels like is getting more fatigued which is prompting visit. exam well appearing, hypoxia on RA, improved with 3L NC, ddx c/f likely covid in known positive pt +/- PNA will get ed covid bundle give steroids, given hypoxia will require admission.

## 2022-01-04 NOTE — H&P ADULT - ASSESSMENT
61 y.o. M w/ a hx of HTN, BPH currently hospitalized for acute hypoxic respiratory failure 2/2 COVID PNA.

## 2022-01-04 NOTE — H&P ADULT - NSICDXPASTSURGICALHX_GEN_ALL_CORE_FT
PAST SURGICAL HISTORY:  H/O fracture of right hip ORIF right [id number]  titanium plate    H/O lithotripsy March 2020

## 2022-01-04 NOTE — ED ADULT NURSE NOTE - OBJECTIVE STATEMENT
Pt. is A+Ox4 and c/o SOB. Pt came in from [facility];s in a wheel chair c/o SOB. as per [facility] staff patient has room air saturation of 90-% went up to 96% with 2 liters of O2 by NC. He states "I tested positive for covid about 10-12 days ago, can't shake it, my oxygen is low and short of breath, have high blood pressure but haven't taken my medicine for about a week". patient denies any CP. feels better with Oxygen, unvaccintaed and is positive for COVID since 12 days.

## 2022-01-04 NOTE — ED STATDOCS - RAPID ASSESSMENT
60 y/o M COVID positive here for mild resp. distress. Room air sat of 90 - 91%. Will transfer to Adult ED.

## 2022-01-04 NOTE — H&P ADULT - HISTORY OF PRESENT ILLNESS
61 y.o. M w/ a hx of HTN, BPH who presents to the ED with SOB.   Patient tested positive for COVID with an at home test about 10 days ago.   In the ED, patient's SpO2 88% on RA, was placed on 2L NC. Patient was given 1 x Dexamethasone.   Unvaccinated.  61 y.o. M w/ a hx of HTN, BPH who presents to the ED with SOB.   sx started 10 days ago- myalgias, fatigue, cough- productive, abdominal pain, 50% of usual PO   + lightheaded  - F/C, rhnorrhea, sore throat, CP,   last BM loose stool  yesterday, no NV + passing gas  - UNvaccinated    O2 low this AM  Patient tested positive for COVID with an at home test about 10 days ago.   In the ED, patient's SpO2 88% on RA, was placed on 2L NC. Patient was given 1 x Dexamethasone.   Unvaccinated.   losty 6-7 lbs   5 7, 158 lb   PMH: HTN- has not been taking amlodipine for 1-2 weeks , BPH  PSH: Prostate surgery, hernia repair, fractured acetbaulum R,   Meds: Amlodipine 5mg,  ALL: none   Tob: never   etoh:P non   dugsw: none   W: [employer]  independently   Dad- heart dx, DM  61 y.o. M w/ a hx of HTN, BPH who presents to the ED with SOB. About 10 days ago patient noted new fatigue, myalgias and cough so he took an at home COVID test which returned positive. Patient noted some mild SOB which worsened. He was only eating about 50% of usual PO intake, believes he lost about 6-7 lbs and noted some mild intermittent lightheadedness. Denies F/C, rhinorrhea, sore throat, CP. Had 2 loose BM's yesterday. Denies N/V, + flatus. Has some mild epigastric abdominal discomfort currently. Has not been vaccinated. Patient has been monitoring SpO2 at home and noted it was steadily decreasing so he presented to the ED.  Patient has not been adherent to amlodipine therapy for about 1-2 weeks.   In the ED, patient's SpO2 88% on RA, was placed on 2L NC. Patient was given 1 x Dexamethasone.

## 2022-01-04 NOTE — H&P ADULT - NSHPREVIEWOFSYSTEMS_GEN_ALL_CORE
ROS:    Constitutional: [ ] fevers [ ] chills [ x] weight loss [ ] weight gain  HEENT: [ ] dry eyes [ ] eye irritation [ ] postnasal drip [ ] nasal congestion  CV: [ ] chest pain [ ] orthopnea [ ] palpitations [ ] murmur  Resp: [ ] cough [ ] shortness of breath [ ] dyspnea [ ] wheezing [ ] sputum [ ] hemoptysis  GI: [ ] nausea [ ] vomiting [ ] diarrhea [ ] constipation [ ] abd pain [ ] dysphagia   : [ ] dysuria [ ] nocturia [ ] hematuria [ ] increased urinary frequency  Musculoskeletal: [ ] back pain [ ] myalgias [ ] arthralgias [ ] fracture  Skin: [ ] rash [ ] itch  Neurological: [ ] headache [ ] dizziness [ ] syncope [ ] weakness [ ] numbness  Psychiatric: [ ] anxiety [ ] depression  Endocrine: [ ] diabetes [ ] thyroid problem  Hematologic/Lymphatic: [ ] anemia [ ] bleeding problem  Allergic/Immunologic: [ ] itchy eyes [ ] nasal discharge [ ] hives [ ] angioedema  [ ] All other systems negative  [ ] Unable to assess ROS because ________

## 2022-01-04 NOTE — ED PROVIDER NOTE - OBJECTIVE STATEMENT
62 y/o male with a hx of HTN presents to the ER c/o 12 days of cough, sob, body aches and fatigue.  Pt reports +COVID test at home 10 days ago.  Pt is unvaccinated.  Pt is a non smoker.  Pt denies chest pain, nausea, vomiting, abdominal pain, back pain.

## 2022-01-04 NOTE — H&P ADULT - NSHPSOCIALHISTORY_GEN_ALL_CORE
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

## 2022-01-04 NOTE — ED ADULT TRIAGE NOTE - CHIEF COMPLAINT QUOTE
pt states "I tested positive for covid about 10-12 days ago, can't shake it, my oxygen is low and short of breath, have high blood pressure but haven't taken my medicine for about a week" pt alert, BCR, R lung clear, L lung diminished pt states "I tested positive for covid about 10-12 days ago, can't shake it, my oxygen is low and short of breath, have high blood pressure but haven't taken my medicine for about a week" pt alert, BCR, R lung clear, L lung diminished  adult ER triage: Pt came in from [facility];s in a wheel chair c/o SOB. as per [facility]'s staff patient has room air saturation of 90-% went up to 96% with 2 liters of O2 by NC. patient denies any CP. feels better with Oxygen, unvaccintaed and is positive for COVID since 12 days. h/o HTN.

## 2022-01-04 NOTE — H&P ADULT - NSHPPHYSICALEXAM_GEN_ALL_CORE
GENERAL: NAD, well-developed male   HEAD:  Atraumatic, Normocephalic  EYES: EOMI, PERRLA, conjunctiva and sclera clear  NECK: Supple, No JVD  CHEST/LUNG: R sided crackles from mid-base, L basilar crackles, no wheezing   HEART: Regular rate and rhythm; No murmurs, rubs, or gallops  ABDOMEN: Soft, Nontender, Nondistended; Bowel sounds present  EXTREMITIES:  2+ Peripheral Pulses, No clubbing, cyanosis, or edema  PSYCH: AAOx3  NEUROLOGY: non-focal  SKIN: No rashes or lesions

## 2022-01-04 NOTE — ED ADULT NURSE NOTE - NSICDXPASTSURGICALHX_GEN_ALL_CORE_FT
PAST SURGICAL HISTORY:  H/O fracture of right hip ORIF right [id number]  titanium plate    H/O lithotripsy March 2020

## 2022-01-04 NOTE — ED PROVIDER NOTE - CLINICAL SUMMARY MEDICAL DECISION MAKING FREE TEXT BOX
60 y/o male with a hx of HTN presents to the ER c/o 12 days of cough, sob, body aches and fatigue.  Pt reports +COVID test at home 10 days ago.  Pt is unvaccinated.  Pt 02 88% at bedside pt placed in 2 liters NC improved to 97%, pt speaking in full sentences.  Will check labs, O2 NC, CXR, steroids, admit.

## 2022-01-04 NOTE — H&P ADULT - NSHPLABSRESULTS_GEN_ALL_CORE
LABS:                        14.7   4.24  )-----------( 185      ( 04 Jan 2022 09:42 )             42.3     04 Jan 2022 09:42    139    |  102    |  9      ----------------------------<  113    3.7     |  25     |  0.74     Ca    8.8        04 Jan 2022 09:42    TPro  7.0    /  Alb  4.1    /  TBili  0.5    /  DBili  x      /  AST  37     /  ALT  22     /  AlkPhos  73     04 Jan 2022 09:42

## 2022-01-04 NOTE — ED ADULT NURSE NOTE - CHIEF COMPLAINT QUOTE
pt states "I tested positive for covid about 10-12 days ago, can't shake it, my oxygen is low and short of breath, have high blood pressure but haven't taken my medicine for about a week" pt alert, BCR, R lung clear, L lung diminished  adult ER triage: Pt came in from [facility];s in a wheel chair c/o SOB. as per [facility]'s staff patient has room air saturation of 90-% went up to 96% with 2 liters of O2 by NC. patient denies any CP. feels better with Oxygen, unvaccintaed and is positive for COVID since 12 days. h/o HTN.

## 2022-01-05 LAB
ALBUMIN SERPL ELPH-MCNC: 4.2 G/DL — SIGNIFICANT CHANGE UP (ref 3.3–5)
ALP SERPL-CCNC: 73 U/L — SIGNIFICANT CHANGE UP (ref 40–120)
ALT FLD-CCNC: 29 U/L — SIGNIFICANT CHANGE UP (ref 4–41)
ANION GAP SERPL CALC-SCNC: 14 MMOL/L — SIGNIFICANT CHANGE UP (ref 7–14)
AST SERPL-CCNC: 40 U/L — SIGNIFICANT CHANGE UP (ref 4–40)
BILIRUB DIRECT SERPL-MCNC: <0.2 MG/DL — SIGNIFICANT CHANGE UP (ref 0–0.3)
BILIRUB INDIRECT FLD-MCNC: >0.1 MG/DL — SIGNIFICANT CHANGE UP (ref 0–1)
BILIRUB SERPL-MCNC: 0.3 MG/DL — SIGNIFICANT CHANGE UP (ref 0.2–1.2)
BUN SERPL-MCNC: 14 MG/DL — SIGNIFICANT CHANGE UP (ref 7–23)
CALCIUM SERPL-MCNC: 9.2 MG/DL — SIGNIFICANT CHANGE UP (ref 8.4–10.5)
CHLORIDE SERPL-SCNC: 105 MMOL/L — SIGNIFICANT CHANGE UP (ref 98–107)
CO2 SERPL-SCNC: 22 MMOL/L — SIGNIFICANT CHANGE UP (ref 22–31)
CREAT SERPL-MCNC: 0.6 MG/DL — SIGNIFICANT CHANGE UP (ref 0.5–1.3)
GLUCOSE SERPL-MCNC: 116 MG/DL — HIGH (ref 70–99)
HCT VFR BLD CALC: 44.5 % — SIGNIFICANT CHANGE UP (ref 39–50)
HCV AB S/CO SERPL IA: 0.11 S/CO — SIGNIFICANT CHANGE UP (ref 0–0.99)
HCV AB SERPL-IMP: SIGNIFICANT CHANGE UP
HGB BLD-MCNC: 14.8 G/DL — SIGNIFICANT CHANGE UP (ref 13–17)
MAGNESIUM SERPL-MCNC: 2.2 MG/DL — SIGNIFICANT CHANGE UP (ref 1.6–2.6)
MCHC RBC-ENTMCNC: 31.4 PG — SIGNIFICANT CHANGE UP (ref 27–34)
MCHC RBC-ENTMCNC: 33.3 GM/DL — SIGNIFICANT CHANGE UP (ref 32–36)
MCV RBC AUTO: 94.5 FL — SIGNIFICANT CHANGE UP (ref 80–100)
NRBC # BLD: 0 /100 WBCS — SIGNIFICANT CHANGE UP
NRBC # FLD: 0 K/UL — SIGNIFICANT CHANGE UP
PHOSPHATE SERPL-MCNC: 3.2 MG/DL — SIGNIFICANT CHANGE UP (ref 2.5–4.5)
PLATELET # BLD AUTO: 229 K/UL — SIGNIFICANT CHANGE UP (ref 150–400)
POTASSIUM SERPL-MCNC: 3.7 MMOL/L — SIGNIFICANT CHANGE UP (ref 3.5–5.3)
POTASSIUM SERPL-SCNC: 3.7 MMOL/L — SIGNIFICANT CHANGE UP (ref 3.5–5.3)
PROT SERPL-MCNC: 7 G/DL — SIGNIFICANT CHANGE UP (ref 6–8.3)
RBC # BLD: 4.71 M/UL — SIGNIFICANT CHANGE UP (ref 4.2–5.8)
RBC # FLD: 12.3 % — SIGNIFICANT CHANGE UP (ref 10.3–14.5)
SODIUM SERPL-SCNC: 141 MMOL/L — SIGNIFICANT CHANGE UP (ref 135–145)
WBC # BLD: 4.88 K/UL — SIGNIFICANT CHANGE UP (ref 3.8–10.5)
WBC # FLD AUTO: 4.88 K/UL — SIGNIFICANT CHANGE UP (ref 3.8–10.5)

## 2022-01-05 PROCEDURE — 99222 1ST HOSP IP/OBS MODERATE 55: CPT

## 2022-01-05 PROCEDURE — 99223 1ST HOSP IP/OBS HIGH 75: CPT

## 2022-01-05 RX ORDER — INFLUENZA VIRUS VACCINE 15; 15; 15; 15 UG/.5ML; UG/.5ML; UG/.5ML; UG/.5ML
0.5 SUSPENSION INTRAMUSCULAR ONCE
Refills: 0 | Status: DISCONTINUED | OUTPATIENT
Start: 2022-01-05 | End: 2022-01-10

## 2022-01-05 RX ADMIN — Medication 3 MILLIGRAM(S): at 01:10

## 2022-01-05 RX ADMIN — Medication 6 MILLIGRAM(S): at 05:52

## 2022-01-05 RX ADMIN — ENOXAPARIN SODIUM 40 MILLIGRAM(S): 100 INJECTION SUBCUTANEOUS at 05:53

## 2022-01-05 RX ADMIN — Medication 600 MILLIGRAM(S): at 18:11

## 2022-01-05 RX ADMIN — Medication 30 MILLILITER(S): at 10:13

## 2022-01-05 RX ADMIN — Medication 600 MILLIGRAM(S): at 05:52

## 2022-01-05 RX ADMIN — AMLODIPINE BESYLATE 5 MILLIGRAM(S): 2.5 TABLET ORAL at 05:53

## 2022-01-05 RX ADMIN — REMDESIVIR 500 MILLIGRAM(S): 5 INJECTION INTRAVENOUS at 17:50

## 2022-01-05 NOTE — CONSULT NOTE ADULT - ASSESSMENT
60 yo M BPH, HTN, initially with SOB  No fever, no leukocytosis  CXR with opacities suspicious for COVID  4LNC  Overall,  1) COVID PNA  - 4L NC  - RemD 5 days then DC  - Dexa 10 days then DC  - Supportive care  2) Hypoxia  - O2 supplementation/anticoagulation per primary team  3) Abnormal finding on imaging  - Likely COVID pna, monitor for bacterial pneumonia in setting of protracted disease course (as outpatient)    Sam Hodge MD  Pager [phone]  From 5pm-9am, and on weekends call [phone]
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
## 2022-01-05 NOTE — PATIENT PROFILE ADULT - FALL HARM RISK - UNIVERSAL INTERVENTIONS
Bed in lowest position, wheels locked, appropriate side rails in place/Call bell, personal items and telephone in reach/Instruct patient to call for assistance before getting out of bed or chair/Non-slip footwear when patient is out of bed/[place] to call system/Physically safe environment - no spills, clutter or unnecessary equipment/Purposeful Proactive Rounding/Room/bathroom lighting operational, light cord in reach

## 2022-01-06 LAB
ALBUMIN SERPL ELPH-MCNC: 4.1 G/DL — SIGNIFICANT CHANGE UP (ref 3.3–5)
ALP SERPL-CCNC: 77 U/L — SIGNIFICANT CHANGE UP (ref 40–120)
ALT FLD-CCNC: 37 U/L — SIGNIFICANT CHANGE UP (ref 4–41)
ANION GAP SERPL CALC-SCNC: 12 MMOL/L — SIGNIFICANT CHANGE UP (ref 7–14)
AST SERPL-CCNC: 36 U/L — SIGNIFICANT CHANGE UP (ref 4–40)
BILIRUB SERPL-MCNC: 0.3 MG/DL — SIGNIFICANT CHANGE UP (ref 0.2–1.2)
BUN SERPL-MCNC: 18 MG/DL — SIGNIFICANT CHANGE UP (ref 7–23)
CALCIUM SERPL-MCNC: 9 MG/DL — SIGNIFICANT CHANGE UP (ref 8.4–10.5)
CHLORIDE SERPL-SCNC: 105 MMOL/L — SIGNIFICANT CHANGE UP (ref 98–107)
CO2 SERPL-SCNC: 22 MMOL/L — SIGNIFICANT CHANGE UP (ref 22–31)
CREAT SERPL-MCNC: 0.59 MG/DL — SIGNIFICANT CHANGE UP (ref 0.5–1.3)
GLUCOSE SERPL-MCNC: 139 MG/DL — HIGH (ref 70–99)
HCT VFR BLD CALC: 44 % — SIGNIFICANT CHANGE UP (ref 39–50)
HGB BLD-MCNC: 14.9 G/DL — SIGNIFICANT CHANGE UP (ref 13–17)
MCHC RBC-ENTMCNC: 31.2 PG — SIGNIFICANT CHANGE UP (ref 27–34)
MCHC RBC-ENTMCNC: 33.9 GM/DL — SIGNIFICANT CHANGE UP (ref 32–36)
MCV RBC AUTO: 92.2 FL — SIGNIFICANT CHANGE UP (ref 80–100)
NRBC # BLD: 0 /100 WBCS — SIGNIFICANT CHANGE UP
NRBC # FLD: 0 K/UL — SIGNIFICANT CHANGE UP
PLATELET # BLD AUTO: 264 K/UL — SIGNIFICANT CHANGE UP (ref 150–400)
POTASSIUM SERPL-MCNC: 4 MMOL/L — SIGNIFICANT CHANGE UP (ref 3.5–5.3)
POTASSIUM SERPL-SCNC: 4 MMOL/L — SIGNIFICANT CHANGE UP (ref 3.5–5.3)
PROT SERPL-MCNC: 6.5 G/DL — SIGNIFICANT CHANGE UP (ref 6–8.3)
RBC # BLD: 4.77 M/UL — SIGNIFICANT CHANGE UP (ref 4.2–5.8)
RBC # FLD: 12.3 % — SIGNIFICANT CHANGE UP (ref 10.3–14.5)
SODIUM SERPL-SCNC: 139 MMOL/L — SIGNIFICANT CHANGE UP (ref 135–145)
WBC # BLD: 8.28 K/UL — SIGNIFICANT CHANGE UP (ref 3.8–10.5)
WBC # FLD AUTO: 8.28 K/UL — SIGNIFICANT CHANGE UP (ref 3.8–10.5)

## 2022-01-06 PROCEDURE — 99233 SBSQ HOSP IP/OBS HIGH 50: CPT

## 2022-01-06 PROCEDURE — 99232 SBSQ HOSP IP/OBS MODERATE 35: CPT

## 2022-01-06 RX ORDER — LANOLIN ALCOHOL/MO/W.PET/CERES
3 CREAM (GRAM) TOPICAL ONCE
Refills: 0 | Status: DISCONTINUED | OUTPATIENT
Start: 2022-01-06 | End: 2022-01-06

## 2022-01-06 RX ORDER — LANOLIN ALCOHOL/MO/W.PET/CERES
6 CREAM (GRAM) TOPICAL AT BEDTIME
Refills: 0 | Status: DISCONTINUED | OUTPATIENT
Start: 2022-01-06 | End: 2022-01-10

## 2022-01-06 RX ADMIN — ENOXAPARIN SODIUM 40 MILLIGRAM(S): 100 INJECTION SUBCUTANEOUS at 07:17

## 2022-01-06 RX ADMIN — AMLODIPINE BESYLATE 5 MILLIGRAM(S): 2.5 TABLET ORAL at 07:20

## 2022-01-06 RX ADMIN — Medication 6 MILLIGRAM(S): at 21:24

## 2022-01-06 RX ADMIN — REMDESIVIR 500 MILLIGRAM(S): 5 INJECTION INTRAVENOUS at 19:38

## 2022-01-06 RX ADMIN — Medication 6 MILLIGRAM(S): at 09:56

## 2022-01-06 RX ADMIN — Medication 600 MILLIGRAM(S): at 19:38

## 2022-01-06 RX ADMIN — Medication 3 MILLIGRAM(S): at 02:16

## 2022-01-06 RX ADMIN — Medication 600 MILLIGRAM(S): at 07:20

## 2022-01-07 LAB
ALBUMIN SERPL ELPH-MCNC: 3.9 G/DL — SIGNIFICANT CHANGE UP (ref 3.3–5)
ALP SERPL-CCNC: 73 U/L — SIGNIFICANT CHANGE UP (ref 40–120)
ALT FLD-CCNC: 39 U/L — SIGNIFICANT CHANGE UP (ref 4–41)
ANION GAP SERPL CALC-SCNC: 13 MMOL/L — SIGNIFICANT CHANGE UP (ref 7–14)
AST SERPL-CCNC: 30 U/L — SIGNIFICANT CHANGE UP (ref 4–40)
BILIRUB SERPL-MCNC: 0.4 MG/DL — SIGNIFICANT CHANGE UP (ref 0.2–1.2)
BUN SERPL-MCNC: 23 MG/DL — SIGNIFICANT CHANGE UP (ref 7–23)
CALCIUM SERPL-MCNC: 9 MG/DL — SIGNIFICANT CHANGE UP (ref 8.4–10.5)
CHLORIDE SERPL-SCNC: 102 MMOL/L — SIGNIFICANT CHANGE UP (ref 98–107)
CO2 SERPL-SCNC: 23 MMOL/L — SIGNIFICANT CHANGE UP (ref 22–31)
CREAT SERPL-MCNC: 0.64 MG/DL — SIGNIFICANT CHANGE UP (ref 0.5–1.3)
CRP SERPL-MCNC: 12.5 MG/L — HIGH
D DIMER BLD IA.RAPID-MCNC: 173 NG/ML DDU — SIGNIFICANT CHANGE UP
FERRITIN SERPL-MCNC: 462 NG/ML — HIGH (ref 30–400)
GLUCOSE SERPL-MCNC: 124 MG/DL — HIGH (ref 70–99)
HCT VFR BLD CALC: 44.5 % — SIGNIFICANT CHANGE UP (ref 39–50)
HGB BLD-MCNC: 15.1 G/DL — SIGNIFICANT CHANGE UP (ref 13–17)
LDH SERPL L TO P-CCNC: 348 U/L — HIGH (ref 135–225)
MAGNESIUM SERPL-MCNC: 2.3 MG/DL — SIGNIFICANT CHANGE UP (ref 1.6–2.6)
MCHC RBC-ENTMCNC: 31.5 PG — SIGNIFICANT CHANGE UP (ref 27–34)
MCHC RBC-ENTMCNC: 33.9 GM/DL — SIGNIFICANT CHANGE UP (ref 32–36)
MCV RBC AUTO: 92.9 FL — SIGNIFICANT CHANGE UP (ref 80–100)
NRBC # BLD: 0 /100 WBCS — SIGNIFICANT CHANGE UP
NRBC # FLD: 0 K/UL — SIGNIFICANT CHANGE UP
PHOSPHATE SERPL-MCNC: 3.5 MG/DL — SIGNIFICANT CHANGE UP (ref 2.5–4.5)
PLATELET # BLD AUTO: 273 K/UL — SIGNIFICANT CHANGE UP (ref 150–400)
POTASSIUM SERPL-MCNC: 4 MMOL/L — SIGNIFICANT CHANGE UP (ref 3.5–5.3)
POTASSIUM SERPL-SCNC: 4 MMOL/L — SIGNIFICANT CHANGE UP (ref 3.5–5.3)
PROCALCITONIN SERPL-MCNC: 0.06 NG/ML — SIGNIFICANT CHANGE UP (ref 0.02–0.1)
PROT SERPL-MCNC: 6.6 G/DL — SIGNIFICANT CHANGE UP (ref 6–8.3)
RBC # BLD: 4.79 M/UL — SIGNIFICANT CHANGE UP (ref 4.2–5.8)
RBC # FLD: 12 % — SIGNIFICANT CHANGE UP (ref 10.3–14.5)
SODIUM SERPL-SCNC: 138 MMOL/L — SIGNIFICANT CHANGE UP (ref 135–145)
WBC # BLD: 8.31 K/UL — SIGNIFICANT CHANGE UP (ref 3.8–10.5)
WBC # FLD AUTO: 8.31 K/UL — SIGNIFICANT CHANGE UP (ref 3.8–10.5)

## 2022-01-07 PROCEDURE — 99233 SBSQ HOSP IP/OBS HIGH 50: CPT

## 2022-01-07 PROCEDURE — 99232 SBSQ HOSP IP/OBS MODERATE 35: CPT

## 2022-01-07 RX ADMIN — REMDESIVIR 500 MILLIGRAM(S): 5 INJECTION INTRAVENOUS at 19:15

## 2022-01-07 RX ADMIN — Medication 6 MILLIGRAM(S): at 21:16

## 2022-01-07 RX ADMIN — ENOXAPARIN SODIUM 40 MILLIGRAM(S): 100 INJECTION SUBCUTANEOUS at 05:04

## 2022-01-07 RX ADMIN — AMLODIPINE BESYLATE 5 MILLIGRAM(S): 2.5 TABLET ORAL at 05:04

## 2022-01-07 RX ADMIN — Medication 600 MILLIGRAM(S): at 19:15

## 2022-01-07 RX ADMIN — Medication 6 MILLIGRAM(S): at 05:04

## 2022-01-07 RX ADMIN — Medication 600 MILLIGRAM(S): at 05:04

## 2022-01-08 LAB
ANION GAP SERPL CALC-SCNC: 10 MMOL/L — SIGNIFICANT CHANGE UP (ref 7–14)
BUN SERPL-MCNC: 22 MG/DL — SIGNIFICANT CHANGE UP (ref 7–23)
CALCIUM SERPL-MCNC: 8.8 MG/DL — SIGNIFICANT CHANGE UP (ref 8.4–10.5)
CHLORIDE SERPL-SCNC: 102 MMOL/L — SIGNIFICANT CHANGE UP (ref 98–107)
CO2 SERPL-SCNC: 24 MMOL/L — SIGNIFICANT CHANGE UP (ref 22–31)
CREAT SERPL-MCNC: 0.61 MG/DL — SIGNIFICANT CHANGE UP (ref 0.5–1.3)
GLUCOSE SERPL-MCNC: 114 MG/DL — HIGH (ref 70–99)
HCT VFR BLD CALC: 45.5 % — SIGNIFICANT CHANGE UP (ref 39–50)
HGB BLD-MCNC: 15.5 G/DL — SIGNIFICANT CHANGE UP (ref 13–17)
MAGNESIUM SERPL-MCNC: 2.3 MG/DL — SIGNIFICANT CHANGE UP (ref 1.6–2.6)
MCHC RBC-ENTMCNC: 31.3 PG — SIGNIFICANT CHANGE UP (ref 27–34)
MCHC RBC-ENTMCNC: 34.1 GM/DL — SIGNIFICANT CHANGE UP (ref 32–36)
MCV RBC AUTO: 91.7 FL — SIGNIFICANT CHANGE UP (ref 80–100)
NRBC # BLD: 0 /100 WBCS — SIGNIFICANT CHANGE UP
NRBC # FLD: 0 K/UL — SIGNIFICANT CHANGE UP
PHOSPHATE SERPL-MCNC: 3 MG/DL — SIGNIFICANT CHANGE UP (ref 2.5–4.5)
PLATELET # BLD AUTO: 276 K/UL — SIGNIFICANT CHANGE UP (ref 150–400)
POTASSIUM SERPL-MCNC: 4 MMOL/L — SIGNIFICANT CHANGE UP (ref 3.5–5.3)
POTASSIUM SERPL-SCNC: 4 MMOL/L — SIGNIFICANT CHANGE UP (ref 3.5–5.3)
RBC # BLD: 4.96 M/UL — SIGNIFICANT CHANGE UP (ref 4.2–5.8)
RBC # FLD: 12 % — SIGNIFICANT CHANGE UP (ref 10.3–14.5)
SODIUM SERPL-SCNC: 136 MMOL/L — SIGNIFICANT CHANGE UP (ref 135–145)
WBC # BLD: 10.04 K/UL — SIGNIFICANT CHANGE UP (ref 3.8–10.5)
WBC # FLD AUTO: 10.04 K/UL — SIGNIFICANT CHANGE UP (ref 3.8–10.5)

## 2022-01-08 RX ORDER — POLYETHYLENE GLYCOL 3350 17 G/17G
17 POWDER, FOR SOLUTION ORAL DAILY
Refills: 0 | Status: DISCONTINUED | OUTPATIENT
Start: 2022-01-08 | End: 2022-01-10

## 2022-01-08 RX ORDER — SENNA PLUS 8.6 MG/1
2 TABLET ORAL AT BEDTIME
Refills: 0 | Status: DISCONTINUED | OUTPATIENT
Start: 2022-01-08 | End: 2022-01-10

## 2022-01-08 RX ADMIN — Medication 600 MILLIGRAM(S): at 19:33

## 2022-01-08 RX ADMIN — POLYETHYLENE GLYCOL 3350 17 GRAM(S): 17 POWDER, FOR SOLUTION ORAL at 16:34

## 2022-01-08 RX ADMIN — Medication 600 MILLIGRAM(S): at 05:35

## 2022-01-08 RX ADMIN — Medication 6 MILLIGRAM(S): at 05:35

## 2022-01-08 RX ADMIN — REMDESIVIR 500 MILLIGRAM(S): 5 INJECTION INTRAVENOUS at 19:29

## 2022-01-08 RX ADMIN — AMLODIPINE BESYLATE 5 MILLIGRAM(S): 2.5 TABLET ORAL at 05:35

## 2022-01-08 RX ADMIN — ENOXAPARIN SODIUM 40 MILLIGRAM(S): 100 INJECTION SUBCUTANEOUS at 05:35

## 2022-01-09 ENCOUNTER — TRANSCRIPTION ENCOUNTER (OUTPATIENT)
Age: 62
End: 2022-01-09

## 2022-01-09 LAB
ANION GAP SERPL CALC-SCNC: 10 MMOL/L — SIGNIFICANT CHANGE UP (ref 7–14)
BUN SERPL-MCNC: 20 MG/DL — SIGNIFICANT CHANGE UP (ref 7–23)
CALCIUM SERPL-MCNC: 8.7 MG/DL — SIGNIFICANT CHANGE UP (ref 8.4–10.5)
CHLORIDE SERPL-SCNC: 100 MMOL/L — SIGNIFICANT CHANGE UP (ref 98–107)
CO2 SERPL-SCNC: 25 MMOL/L — SIGNIFICANT CHANGE UP (ref 22–31)
CREAT SERPL-MCNC: 0.6 MG/DL — SIGNIFICANT CHANGE UP (ref 0.5–1.3)
GLUCOSE SERPL-MCNC: 138 MG/DL — HIGH (ref 70–99)
HCT VFR BLD CALC: 44.8 % — SIGNIFICANT CHANGE UP (ref 39–50)
HGB BLD-MCNC: 15.7 G/DL — SIGNIFICANT CHANGE UP (ref 13–17)
MCHC RBC-ENTMCNC: 31.5 PG — SIGNIFICANT CHANGE UP (ref 27–34)
MCHC RBC-ENTMCNC: 35 GM/DL — SIGNIFICANT CHANGE UP (ref 32–36)
MCV RBC AUTO: 89.8 FL — SIGNIFICANT CHANGE UP (ref 80–100)
NRBC # BLD: 0 /100 WBCS — SIGNIFICANT CHANGE UP
NRBC # FLD: 0 K/UL — SIGNIFICANT CHANGE UP
PLATELET # BLD AUTO: 281 K/UL — SIGNIFICANT CHANGE UP (ref 150–400)
POTASSIUM SERPL-MCNC: 4.1 MMOL/L — SIGNIFICANT CHANGE UP (ref 3.5–5.3)
POTASSIUM SERPL-SCNC: 4.1 MMOL/L — SIGNIFICANT CHANGE UP (ref 3.5–5.3)
RBC # BLD: 4.99 M/UL — SIGNIFICANT CHANGE UP (ref 4.2–5.8)
RBC # FLD: 12.2 % — SIGNIFICANT CHANGE UP (ref 10.3–14.5)
SODIUM SERPL-SCNC: 135 MMOL/L — SIGNIFICANT CHANGE UP (ref 135–145)
WBC # BLD: 11.1 K/UL — HIGH (ref 3.8–10.5)
WBC # FLD AUTO: 11.1 K/UL — HIGH (ref 3.8–10.5)

## 2022-01-09 RX ADMIN — SENNA PLUS 2 TABLET(S): 8.6 TABLET ORAL at 22:54

## 2022-01-09 RX ADMIN — Medication 6 MILLIGRAM(S): at 22:54

## 2022-01-09 RX ADMIN — Medication 600 MILLIGRAM(S): at 06:48

## 2022-01-09 RX ADMIN — SENNA PLUS 2 TABLET(S): 8.6 TABLET ORAL at 00:26

## 2022-01-09 RX ADMIN — AMLODIPINE BESYLATE 5 MILLIGRAM(S): 2.5 TABLET ORAL at 06:48

## 2022-01-09 RX ADMIN — ENOXAPARIN SODIUM 40 MILLIGRAM(S): 100 INJECTION SUBCUTANEOUS at 06:47

## 2022-01-09 RX ADMIN — Medication 6 MILLIGRAM(S): at 06:47

## 2022-01-09 RX ADMIN — Medication 6 MILLIGRAM(S): at 00:26

## 2022-01-09 RX ADMIN — POLYETHYLENE GLYCOL 3350 17 GRAM(S): 17 POWDER, FOR SOLUTION ORAL at 22:54

## 2022-01-09 NOTE — DISCHARGE NOTE PROVIDER - CARE PROVIDER_API CALL
Michela Hay)  Family Medicine  [address] [place] [address]  Phone: [phone]  Fax: [phone]  Established Patient  Follow Up Time: 1 week

## 2022-01-09 NOTE — DISCHARGE NOTE PROVIDER - HOSPITAL COURSE
61 y.o. M w/ a hx of HTN, BPH currently hospitalized for acute hypoxic respiratory failure 2/2 COVID PNA.     Acute respiratory failure with hypoxia secondary to COVID PNA  - Covid pcr positive  - CXR: Elevated right hemidiaphragm. Left mid and bilateral lower lung patchy opacities, nonspecific findings,   which can be due to an atypical/viral infection such as Covid 19.  - Oxygen saturation monitored, supplemental oxygen provided as needed  - Dexemethasone (1/4-  ) remdesvir (1/4-1/9)  - Inflammatory markers ordered   - Seen and evaluated by Infectious disease and Pulm     HTN (hypertension).   - Amlodipine continued   - DASH diet  - Pt to follow up with PCP as outpatient for further blood pressure management as outpatient      Prophylactic measure.   - Lovenox 40 QD.    On_________, discussed with __________, patient is medically cleared and optimized for discharge today. All medications were reviewed with attending, and sent to mutually agreed upon pharmacy.   61 y.o. M w/ a hx of HTN, BPH currently hospitalized for acute hypoxic respiratory failure 2/2 COVID PNA.     Acute respiratory failure with hypoxia secondary to COVID PNA  - Covid pcr positive  - CXR: Elevated right hemidiaphragm. Left mid and bilateral lower lung patchy opacities, nonspecific findings,   which can be due to an atypical/viral infection such as Covid 19.  - Oxygen saturation monitored, supplemental oxygen provided as needed  - Dexemethasone (1/4-  ) remdesvir (1/4-1/9)  - Inflammatory markers ordered   - Seen and evaluated by Infectious disease and Pulm     HTN (hypertension).   - Amlodipine continued   - DASH diet  - Pt to follow up with PCP as outpatient for further blood pressure management as outpatient      Prophylactic measure.   - Lovenox 40 QD.    On 01/10/2022, discussed with Dr. Ran Álvarez, patient is medically cleared and optimized for discharge today. All medications were reviewed with attending, and sent to mutually agreed upon pharmacy.

## 2022-01-09 NOTE — DISCHARGE NOTE PROVIDER - NSDCCPCAREPLAN_GEN_ALL_CORE_FT
PRINCIPAL DISCHARGE DIAGNOSIS  Diagnosis: COVID-19  Assessment and Plan of Treatment: You have been diagnosed with the COVID-19 virus during your hospital stay. You must self quarantine to complete a 14 day time period.  Monitor for fevers, shortness of breath and cough primarily.  Monitor your temperature daily to not any changes and increases.    It has been determined that you no longer need hospitalization and can recover while remaining in self-quarantine at home. You should follow the prevention steps below until a healthcare provider or local or state health department says you can return to your normal activities.  1. You should restrict activities outside your home, except for getting medical care.  2. Do not go to work, school, or public areas.  3. Avoid using public transportation, ride-sharing, or taxis.  4. Separate yourself from other people and animals in your home.  5. Call ahead before visiting your doctor.  6. Wear a facemask.  7. Cover your coughs and sneezes.  8. Clean your hands often.  9. Avoid sharing personal household items.  10. Clean all “high-touch” surfaces everyday.  11. Monitor your symptoms.  If you have a medical emergency and need to call 911, notify the dispatch personnel that you have COVID-19 If possible, put on a facemask before emergency medical services arrive.  12. Stopping home isolation.  Patients with confirmed COVID-19 should remain under home isolation precautions for 14 days since the positive COVID-19 test and until the risk of secondary transmission to others is thought to be low. The decision to discontinue home isolation precautions should be made on a case-by-case basis, in consultation with healthcare providers and state and local health departments. Your [facility] Department of Health can be reached at [phone] for further information about COVID-19.        SECONDARY DISCHARGE DIAGNOSES  Diagnosis: HTN (hypertension)  Assessment and Plan of Treatment: Low sodium and fat diet, continue anti-hypertensive medications, and follow up with primary care physician.

## 2022-01-10 ENCOUNTER — TRANSCRIPTION ENCOUNTER (OUTPATIENT)
Age: 62
End: 2022-01-10

## 2022-01-10 VITALS
SYSTOLIC BLOOD PRESSURE: 114 MMHG | TEMPERATURE: 99 F | OXYGEN SATURATION: 95 % | HEART RATE: 60 BPM | RESPIRATION RATE: 18 BRPM | DIASTOLIC BLOOD PRESSURE: 76 MMHG

## 2022-01-10 LAB
ANION GAP SERPL CALC-SCNC: 13 MMOL/L — SIGNIFICANT CHANGE UP (ref 7–14)
BUN SERPL-MCNC: 23 MG/DL — SIGNIFICANT CHANGE UP (ref 7–23)
CALCIUM SERPL-MCNC: 9 MG/DL — SIGNIFICANT CHANGE UP (ref 8.4–10.5)
CHLORIDE SERPL-SCNC: 99 MMOL/L — SIGNIFICANT CHANGE UP (ref 98–107)
CO2 SERPL-SCNC: 21 MMOL/L — LOW (ref 22–31)
CREAT SERPL-MCNC: 0.66 MG/DL — SIGNIFICANT CHANGE UP (ref 0.5–1.3)
GLUCOSE SERPL-MCNC: 129 MG/DL — HIGH (ref 70–99)
HCT VFR BLD CALC: 44.4 % — SIGNIFICANT CHANGE UP (ref 39–50)
HGB BLD-MCNC: 15.6 G/DL — SIGNIFICANT CHANGE UP (ref 13–17)
MCHC RBC-ENTMCNC: 32.1 PG — SIGNIFICANT CHANGE UP (ref 27–34)
MCHC RBC-ENTMCNC: 35.1 GM/DL — SIGNIFICANT CHANGE UP (ref 32–36)
MCV RBC AUTO: 91.4 FL — SIGNIFICANT CHANGE UP (ref 80–100)
NRBC # BLD: 0 /100 WBCS — SIGNIFICANT CHANGE UP
NRBC # FLD: 0 K/UL — SIGNIFICANT CHANGE UP
PLATELET # BLD AUTO: 297 K/UL — SIGNIFICANT CHANGE UP (ref 150–400)
POTASSIUM SERPL-MCNC: 4.4 MMOL/L — SIGNIFICANT CHANGE UP (ref 3.5–5.3)
POTASSIUM SERPL-SCNC: 4.4 MMOL/L — SIGNIFICANT CHANGE UP (ref 3.5–5.3)
RBC # BLD: 4.86 M/UL — SIGNIFICANT CHANGE UP (ref 4.2–5.8)
RBC # FLD: 12.4 % — SIGNIFICANT CHANGE UP (ref 10.3–14.5)
SODIUM SERPL-SCNC: 133 MMOL/L — LOW (ref 135–145)
WBC # BLD: 10.85 K/UL — HIGH (ref 3.8–10.5)
WBC # FLD AUTO: 10.85 K/UL — HIGH (ref 3.8–10.5)

## 2022-01-10 RX ADMIN — POLYETHYLENE GLYCOL 3350 17 GRAM(S): 17 POWDER, FOR SOLUTION ORAL at 11:44

## 2022-01-10 RX ADMIN — AMLODIPINE BESYLATE 5 MILLIGRAM(S): 2.5 TABLET ORAL at 06:37

## 2022-01-10 RX ADMIN — ENOXAPARIN SODIUM 40 MILLIGRAM(S): 100 INJECTION SUBCUTANEOUS at 06:37

## 2022-01-10 RX ADMIN — Medication 6 MILLIGRAM(S): at 06:37

## 2022-01-10 NOTE — PROGRESS NOTE ADULT - PROBLEM SELECTOR PLAN 1
2/2 COVID PNA, requiring oxygen  dex+remdesvir  f/u inflammatory markers  suppl oxygen   ID f/u
2/2 COVID PNA, requiring 2L   dex+remdesvir  f/u inflammatory markers  suppl oxygen   ID f/u
2/2 COVID PNA, requiring oxygen  dex+remdesvir  f/u inflammatory markers  pt saturating well on ra
2/2 COVID PNA, requiring oxygen  s/p dex+remdesvir  f/u inflammatory markers  pt saturating well on ra

## 2022-01-10 NOTE — PROGRESS NOTE ADULT - ASSESSMENT
60 yo M BPH, HTN, initially with SOB  No fever, no leukocytosis  CXR with opacities suspicious for COVID  4LNC  Overall,  1) COVID PNA  - 4L NC  - RemD 5 days then DC  - Dexa 10 days then DC  - Supportive care  2) Hypoxia  - O2 supplementation/anticoagulation per primary team  3) Abnormal finding on imaging  - Likely COVID pna, monitor for bacterial pneumonia in setting of protracted disease course (as outpatient)    Sam Hodge MD  Pager [phone]  From 5pm-9am, and on weekends call [phone]
60 yo M BPH, HTN, initially with SOB  No fever, no leukocytosis  CXR with opacities suspicious for COVID  3LNC  Overall,  1) COVID PNA  - 3L NC  - RemD 5 days then DC  - Dexa 10 days then DC  - Supportive care  2) Hypoxia  - O2 supplementation/anticoagulation per primary team  3) Abnormal finding on imaging  - Likely COVID pna, monitor for bacterial pneumonia in setting of protracted disease course    Sam Hodge MD  Pager [phone]  From 5pm-9am, and on weekends call [phone]
61 y.o. M w/ a hx of HTN, BPH currently hospitalized for acute hypoxic respiratory failure 2/2 COVID PNA. 

## 2022-01-10 NOTE — DISCHARGE NOTE NURSING/CASE MANAGEMENT/SOCIAL WORK - PATIENT PORTAL LINK FT
You can access the FollowMyHealth Patient Portal offered by [facility] by registering at the following website: [email or web address]. By joining [employer]’s FollowMyHealth portal, you will also be able to view your health information using other applications (apps) compatible with our system.

## 2022-01-10 NOTE — PROGRESS NOTE ADULT - PROBLEM SELECTOR PROBLEM 2
HTN (hypertension)
anticipated discharge recommendation/impairments found

## 2022-01-10 NOTE — PROGRESS NOTE ADULT - SUBJECTIVE AND OBJECTIVE BOX
CC: F/U for COVID    Saw/spoke to patient. No fevers, no chills. No new complaints.    Allergies  No Known Allergies    ANTIMICROBIALS:  remdesivir  IVPB    remdesivir  IVPB 100 every 24 hours    PE:    Vital Signs Last 24 Hrs  T(C): 36.3 (07 Jan 2022 05:02), Max: 36.3 (06 Jan 2022 17:00)  T(F): 97.3 (07 Jan 2022 05:02), Max: 97.4 (06 Jan 2022 17:00)  HR: 65 (07 Jan 2022 05:02) (65 - 73)  BP: 132/73 (07 Jan 2022 05:02) (132/73 - 138/73)  RR: 18 (07 Jan 2022 05:02) (17 - 18)  SpO2: 96% (07 Jan 2022 05:02) (94% - 96%)    Gen: AOx3, NAD, non-toxic  CV: S1+S2 normal, nontachycardic  Resp: Clear bilat, no resp distress, no crackles/wheezes, NC 3L  Abd: Soft, nontender, +BS  Ext: No LE edema, no wounds    LABS:                        15.1   8.31  )-----------( 273      ( 07 Jan 2022 07:15 )             44.5     01-07    138  |  102  |  23  ----------------------------<  124<H>  4.0   |  23  |  0.64    Ca    9.0      07 Jan 2022 07:15  Phos  3.5     01-07  Mg     2.30     01-07    TPro  6.6  /  Alb  3.9  /  TBili  0.4  /  DBili  x   /  AST  30  /  ALT  39  /  AlkPhos  73  01-07    MICROBIOLOGY:    COVID+    RADIOLOGY:    1/4 XR:    IMPRESSION:  Elevated right hemidiaphragm.    Left mid and bilateral lower lung patchy opacities, nonspecific findings,   which can be due to an atypical/viral infection such as Covid 19.
CC: F/U for COVID    Saw/spoke to patient. On O2. Generally well, no other new complaints.    Allergies  No Known Allergies    ANTIMICROBIALS:  remdesivir  IVPB    remdesivir  IVPB 100 every 24 hours  dexAMETHasone     Tablet 6 milliGRAM(s) Oral daily    PE:    Vital Signs Last 24 Hrs  T(C): 36.7 (06 Jan 2022 10:00), Max: 36.7 (05 Jan 2022 16:48)  T(F): 98 (06 Jan 2022 10:00), Max: 98.1 (05 Jan 2022 16:48)  HR: 70 (06 Jan 2022 10:00) (70 - 87)  BP: 127/66 (06 Jan 2022 10:00) (127/66 - 152/72)  RR: 17 (06 Jan 2022 10:00) (17 - 18)  SpO2: 94% (06 Jan 2022 10:00) (94% - 97%)    Gen: AOx3, NAD, non-toxic  CV: S1+S2 normal, nontachycardic  Resp: Clear bilat, no resp distress, no crackles/wheezes  Abd: Soft, nontender, +BS  Ext: No LE edema, no wounds    LABS:                        14.9   8.28  )-----------( 264      ( 06 Jan 2022 06:55 )             44.0     01-06    139  |  105  |  18  ----------------------------<  139<H>  4.0   |  22  |  0.59    Ca    9.0      06 Jan 2022 06:55  Phos  3.2     01-05  Mg     2.20     01-05    TPro  6.5  /  Alb  4.1  /  TBili  0.3  /  DBili  x   /  AST  36  /  ALT  37  /  AlkPhos  77  01-06    MICROBIOLOGY:    COVID+    RADIOLOGY:    1/4 XR:    Heart size and the mediastinum cannot be accurately evaluated on this   projection.  The right hemidiaphragm is elevated.  There are left mid and bilateral lower lung patchy opacities.  No pleural effusion or pneumothorax is seen.  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      IMPRESSION:  Elevated right hemidiaphragm.    Left mid and bilateral lower lung patchy opacities, nonspecific findings,   which can be due to an atypical/viral infection such as Covid 19.  
Pulmonary Follow Up Note    CONY VAZQUEZ  MRN-[id number]    Chief Complaint: Patient is a 61y old  Male who presents with a chief complaint of Hypoxia (04 Jan 2022 11:32)      HPI:  resting comfortably in bed  feels about the same  down to 3L nc        MEDICATIONS  (STANDING):  amLODIPine   Tablet 5 milliGRAM(s) Oral daily  dexAMETHasone     Tablet 6 milliGRAM(s) Oral daily  enoxaparin Injectable 40 milliGRAM(s) SubCutaneous daily  guaiFENesin  milliGRAM(s) Oral every 12 hours  influenza   Vaccine 0.5 milliLiter(s) IntraMuscular once  remdesivir  IVPB   IV Intermittent   remdesivir  IVPB 100 milliGRAM(s) IV Intermittent every 24 hours    MEDICATIONS  (PRN):  acetaminophen     Tablet .. 650 milliGRAM(s) Oral every 6 hours PRN Temp greater or equal to 38C (100.4F), Mild Pain (1 - 3)  aluminum hydroxide/magnesium hydroxide/simethicone Suspension 30 milliLiter(s) Oral every 4 hours PRN Dyspepsia  melatonin 6 milliGRAM(s) Oral at bedtime PRN Insomnia        EXAM:  Vital Signs Last 24 Hrs  T(C): 36.3 (07 Jan 2022 05:02), Max: 36.3 (06 Jan 2022 17:00)  T(F): 97.3 (07 Jan 2022 05:02), Max: 97.4 (06 Jan 2022 17:00)  HR: 65 (07 Jan 2022 05:02) (65 - 73)  BP: 132/73 (07 Jan 2022 05:02) (132/73 - 138/73)  BP(mean): --  RR: 18 (07 Jan 2022 05:02) (17 - 18)  SpO2: 96% (07 Jan 2022 05:02) (94% - 96%)  GENERAL: No acute distress  NEURO: Alert and oriented x 3  LUNGS: respirations unlabored      LABS/IMAGING: reviewed                                   15.1   8.31  )-----------( 273      ( 07 Jan 2022 07:15 )             44.5   01-07    138  |  102  |  23  ----------------------------<  124<H>  4.0   |  23  |  0.64    Ca    9.0      07 Jan 2022 07:15  Phos  3.5     01-07  Mg     2.30     01-07    TPro  6.6  /  Alb  3.9  /  TBili  0.4  /  DBili  x   /  AST  30  /  ALT  39  /  AlkPhos  73  01-07      < from: Xray Chest 1 View- PORTABLE-Urgent (01.04.22 @ 10:16) >    ACC: [id number] EXAM:  XR CHEST PORTABLE URGENT 1V                          PROCEDURE DATE:  01/04/2022          INTERPRETATION:  TIME OF EXAM: January 4, 2022 at 10:05 AM.    CLINICAL INFORMATION: Shortness of breath, cough, fever.    COMPARISON:  April 10, 2014.    TECHNIQUE:   AP Portable chest x-ray.    INTERPRETATION:    Heart size and the mediastinum cannot be accurately evaluated on this   projection.  The right hemidiaphragm is elevated.  There are left mid and bilateral lower lung patchyopacities.  No pleural effusion or pneumothorax is seen.  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      IMPRESSION:  Elevated right hemidiaphragm.    Left mid and bilateral lower lung patchy opacities, nonspecific findings,   which can be due to an atypical/viral infection such as Covid 19.    --- End of Report ---          < end of copied text >    PROBLEM LIST:  61yMale with HEALTH ISSUES - PROBLEM Dx:  Acute respiratory failure with hypoxia    Prophylactic measure    HTN (hypertension)      RECS:  -remdesivir/dex  -supplemental O2, wean as tolerated  -incentive nuha   -fu ID  -dvt prophylaxis, trend ddimer     please feel free to call with any questions [phone]  Edda Mcdaniel MD
Pulmonary Follow Up Note    CONY VAZQUEZ  MRN-[id number]    Chief Complaint: Patient is a 61y old  Male who presents with a chief complaint of Hypoxia (04 Jan 2022 11:32)      HPI:  resting comfortably in bed  feels about the same      MEDICATIONS  (STANDING):  amLODIPine   Tablet 5 milliGRAM(s) Oral daily  dexAMETHasone     Tablet 6 milliGRAM(s) Oral daily  enoxaparin Injectable 40 milliGRAM(s) SubCutaneous daily  guaiFENesin  milliGRAM(s) Oral every 12 hours  influenza   Vaccine 0.5 milliLiter(s) IntraMuscular once  remdesivir  IVPB   IV Intermittent   remdesivir  IVPB 100 milliGRAM(s) IV Intermittent every 24 hours    MEDICATIONS  (PRN):  acetaminophen     Tablet .. 650 milliGRAM(s) Oral every 6 hours PRN Temp greater or equal to 38C (100.4F), Mild Pain (1 - 3)  aluminum hydroxide/magnesium hydroxide/simethicone Suspension 30 milliLiter(s) Oral every 4 hours PRN Dyspepsia  melatonin 6 milliGRAM(s) Oral at bedtime PRN Insomnia        EXAM:  Vital Signs Last 24 Hrs  T(C): 36.7 (06 Jan 2022 10:00), Max: 36.7 (05 Jan 2022 16:48)  T(F): 98 (06 Jan 2022 10:00), Max: 98.1 (05 Jan 2022 16:48)  HR: 70 (06 Jan 2022 10:00) (70 - 87)  BP: 127/66 (06 Jan 2022 10:00) (127/66 - 152/72)  BP(mean): --  RR: 17 (06 Jan 2022 10:00) (17 - 18)  SpO2: 94% (06 Jan 2022 10:00) (94% - 97%)  GENERAL: No acute distress  NEURO: Alert and oriented x 3  LUNGS: respirations unlabored      LABS/IMAGING: reviewed                                   14.9   8.28  )-----------( 264      ( 06 Jan 2022 06:55 )             44.0   01-06    139  |  105  |  18  ----------------------------<  139<H>  4.0   |  22  |  0.59    Ca    9.0      06 Jan 2022 06:55  Phos  3.2     01-05  Mg     2.20     01-05    TPro  6.5  /  Alb  4.1  /  TBili  0.3  /  DBili  x   /  AST  36  /  ALT  37  /  AlkPhos  77  01-06    < from: Xray Chest 1 View- PORTABLE-Urgent (01.04.22 @ 10:16) >    ACC: [id number] EXAM:  XR CHEST PORTABLE URGENT 1V                          PROCEDURE DATE:  01/04/2022          INTERPRETATION:  TIME OF EXAM: January 4, 2022 at 10:05 AM.    CLINICAL INFORMATION: Shortness of breath, cough, fever.    COMPARISON:  April 10, 2014.    TECHNIQUE:   AP Portable chest x-ray.    INTERPRETATION:    Heart size and the mediastinum cannot be accurately evaluated on this   projection.  The right hemidiaphragm is elevated.  There are left mid and bilateral lower lung patchyopacities.  No pleural effusion or pneumothorax is seen.  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      IMPRESSION:  Elevated right hemidiaphragm.    Left mid and bilateral lower lung patchy opacities, nonspecific findings,   which can be due to an atypical/viral infection such as Covid 19.    --- End of Report ---          < end of copied text >    PROBLEM LIST:  61yMale with HEALTH ISSUES - PROBLEM Dx:  Acute respiratory failure with hypoxia    Prophylactic measure    HTN (hypertension)      RECS:  -remdesivir/dex  -supplemental O2, wean as tolerated  -incentive nuha   -fu ID  -dvt prophylaxis, trend ddimer     please feel free to call with any questions [phone]  Edda Mcdaniel MD
    SUBJECTIVE / OVERNIGHT EVENTS:pt seen and examined  01-08-22     MEDICATIONS  (STANDING):  amLODIPine   Tablet 5 milliGRAM(s) Oral daily  dexAMETHasone     Tablet 6 milliGRAM(s) Oral daily  enoxaparin Injectable 40 milliGRAM(s) SubCutaneous daily  guaiFENesin  milliGRAM(s) Oral every 12 hours  influenza   Vaccine 0.5 milliLiter(s) IntraMuscular once  polyethylene glycol 3350 17 Gram(s) Oral daily  senna 2 Tablet(s) Oral at bedtime    MEDICATIONS  (PRN):  acetaminophen     Tablet .. 650 milliGRAM(s) Oral every 6 hours PRN Temp greater or equal to 38C (100.4F), Mild Pain (1 - 3)  aluminum hydroxide/magnesium hydroxide/simethicone Suspension 30 milliLiter(s) Oral every 4 hours PRN Dyspepsia  melatonin 6 milliGRAM(s) Oral at bedtime PRN Insomnia    Vital Signs Last 24 Hrs  T(C): 36.8 (01-08-22 @ 14:23), Max: 36.8 (01-08-22 @ 14:23)  T(F): 98.3 (01-08-22 @ 14:23), Max: 98.3 (01-08-22 @ 14:23)  HR: 69 (01-08-22 @ 14:23) (60 - 69)  BP: 111/59 (01-08-22 @ 14:23) (111/59 - 141/71)  BP(mean): --  RR: 18 (01-08-22 @ 14:23) (18 - 18)  SpO2: 98% (01-08-22 @ 14:23) (95% - 98%)    Constitutional: No fever, fatigue  Skin: No rash.  Eyes: No recent vision problems or eye pain.  ENT: No congestion, ear pain, or sore throat.  Cardiovascular: No chest pain or palpation.  Respiratory: sob+  Gastrointestinal: No abdominal pain, nausea, vomiting, or diarrhea.  Genitourinary: No dysuria.  Musculoskeletal: No joint swelling.  Neurologic: No headache.    PHYSICAL EXAM:  GENERAL: NAD  EYES: EOMI, PERRLA  NECK: Supple, No JVD  CHEST/LUNG: dec breath sounds at bases  HEART:  S1 , S2 +  ABDOMEN: soft , bs+  EXTREMITIES:  no edema  NEUROLOGY:alert awake    LABS:  01-08    136  |  102  |  22  ----------------------------<  114<H>  4.0   |  24  |  0.61    Ca    8.8      08 Jan 2022 06:46  Phos  3.0     01-08  Mg     2.30     01-08    TPro  6.6  /  Alb  3.9  /  TBili  0.4  /  DBili      /  AST  30  /  ALT  39  /  AlkPhos  73  01-07    Creatinine Trend: 0.61 <--, 0.64 <--, 0.59 <--, 0.60 <--, 0.74 <--                        15.5   10.04 )-----------( 276      ( 08 Jan 2022 07:05 )             45.5     Urine Studies:            LIVER FUNCTIONS - ( 07 Jan 2022 07:15 )  Alb: 3.9 g/dL / Pro: 6.6 g/dL / ALK PHOS: 73 U/L / ALT: 39 U/L / AST: 30 U/L / GGT: x             LIVER FUNCTIONS - ( 07 Jan 2022 07:15 )  Alb: 3.9 g/dL / Pro: 6.6 g/dL / ALK PHOS: 73 U/L / ALT: 39 U/L / AST: 30 U/L / GGT: x                 Consultant(s) Notes Reviewed:      Care Discussed with Consultants/Other Providers:  
    SUBJECTIVE / OVERNIGHT EVENTS:pt seen and examined  01-05-22     MEDICATIONS  (STANDING):  amLODIPine   Tablet 5 milliGRAM(s) Oral daily  dexAMETHasone     Tablet 6 milliGRAM(s) Oral daily  enoxaparin Injectable 40 milliGRAM(s) SubCutaneous daily  guaiFENesin  milliGRAM(s) Oral every 12 hours  influenza   Vaccine 0.5 milliLiter(s) IntraMuscular once  remdesivir  IVPB   IV Intermittent   remdesivir  IVPB 100 milliGRAM(s) IV Intermittent every 24 hours    MEDICATIONS  (PRN):  acetaminophen     Tablet .. 650 milliGRAM(s) Oral every 6 hours PRN Temp greater or equal to 38C (100.4F), Mild Pain (1 - 3)  aluminum hydroxide/magnesium hydroxide/simethicone Suspension 30 milliLiter(s) Oral every 4 hours PRN Dyspepsia  melatonin 3 milliGRAM(s) Oral at bedtime PRN Insomnia    T(C): 36.7 (01-05-22 @ 16:48), Max: 37.2 (01-05-22 @ 00:50)  HR: 87 (01-05-22 @ 16:48) (78 - 91)  BP: 136/73 (01-05-22 @ 16:48) (136/73 - 155/82)  RR: 18 (01-05-22 @ 16:48) (18 - 20)  SpO2: 95% (01-05-22 @ 16:48) (92% - 95%)    CAPILLARY BLOOD GLUCOSE        I&O's Summary    05 Jan 2022 07:01  -  05 Jan 2022 21:07  --------------------------------------------------------  IN: 0 mL / OUT: 870 mL / NET: -870 mL        Constitutional: No fever, fatigue  Skin: No rash.  Eyes: No recent vision problems or eye pain.  ENT: No congestion, ear pain, or sore throat.  Cardiovascular: No chest pain or palpation.  Respiratory: sob+  Gastrointestinal: No abdominal pain, nausea, vomiting, or diarrhea.  Genitourinary: No dysuria.  Musculoskeletal: No joint swelling.  Neurologic: No headache.    PHYSICAL EXAM:  GENERAL: NAD  EYES: EOMI, PERRLA  NECK: Supple, No JVD  CHEST/LUNG: dec breath sounds at bases  HEART:  S1 , S2 +  ABDOMEN: soft , bs+  EXTREMITIES:  no edema  NEUROLOGY:alert awake      LABS:                        14.8   4.88  )-----------( 229      ( 05 Jan 2022 07:47 )             44.5     01-05    141  |  105  |  14  ----------------------------<  116<H>  3.7   |  22  |  0.60    Ca    9.2      05 Jan 2022 07:47  Phos  3.2     01-05  Mg     2.20     01-05    TPro  7.0  /  Alb  4.2  /  TBili  0.3  /  DBili  <0.2  /  AST  40  /  ALT  29  /  AlkPhos  73  01-05              RADIOLOGY & ADDITIONAL TESTS:    Imaging Personally Reviewed:    Consultant(s) Notes Reviewed:      Care Discussed with Consultants/Other Providers:  
    SUBJECTIVE / OVERNIGHT EVENTS:pt seen and examined  01-06-22     MEDICATIONS  (STANDING):  amLODIPine   Tablet 5 milliGRAM(s) Oral daily  dexAMETHasone     Tablet 6 milliGRAM(s) Oral daily  enoxaparin Injectable 40 milliGRAM(s) SubCutaneous daily  guaiFENesin  milliGRAM(s) Oral every 12 hours  influenza   Vaccine 0.5 milliLiter(s) IntraMuscular once  remdesivir  IVPB   IV Intermittent   remdesivir  IVPB 100 milliGRAM(s) IV Intermittent every 24 hours    MEDICATIONS  (PRN):  acetaminophen     Tablet .. 650 milliGRAM(s) Oral every 6 hours PRN Temp greater or equal to 38C (100.4F), Mild Pain (1 - 3)  aluminum hydroxide/magnesium hydroxide/simethicone Suspension 30 milliLiter(s) Oral every 4 hours PRN Dyspepsia  melatonin 6 milliGRAM(s) Oral at bedtime PRN Insomnia    Vital Signs Last 24 Hrs  T(C): 36.7 (01-06-22 @ 10:00), Max: 36.7 (01-06-22 @ 10:00)  T(F): 98 (01-06-22 @ 10:00), Max: 98 (01-06-22 @ 10:00)  HR: 70 (01-06-22 @ 10:00) (70 - 79)  BP: 127/66 (01-06-22 @ 10:00) (127/66 - 152/72)  BP(mean): --  RR: 17 (01-06-22 @ 10:00) (17 - 18)  SpO2: 94% (01-06-22 @ 10:00) (94% - 97%)    Constitutional: No fever, fatigue  Skin: No rash.  Eyes: No recent vision problems or eye pain.  ENT: No congestion, ear pain, or sore throat.  Cardiovascular: No chest pain or palpation.  Respiratory: sob+  Gastrointestinal: No abdominal pain, nausea, vomiting, or diarrhea.  Genitourinary: No dysuria.  Musculoskeletal: No joint swelling.  Neurologic: No headache.    PHYSICAL EXAM:  GENERAL: NAD  EYES: EOMI, PERRLA  NECK: Supple, No JVD  CHEST/LUNG: dec breath sounds at bases  HEART:  S1 , S2 +  ABDOMEN: soft , bs+  EXTREMITIES:  no edema  NEUROLOGY:alert awake    LABS:  01-06    139  |  105  |  18  ----------------------------<  139<H>  4.0   |  22  |  0.59    Ca    9.0      06 Jan 2022 06:55  Phos  3.2     01-05  Mg     2.20     01-05    TPro  6.5  /  Alb  4.1  /  TBili  0.3  /  DBili      /  AST  36  /  ALT  37  /  AlkPhos  77  01-06    Creatinine Trend: 0.59 <--, 0.60 <--, 0.74 <--                        14.9   8.28  )-----------( 264      ( 06 Jan 2022 06:55 )             44.0     Urine Studies:            LIVER FUNCTIONS - ( 06 Jan 2022 06:55 )  Alb: 4.1 g/dL / Pro: 6.5 g/dL / ALK PHOS: 77 U/L / ALT: 37 U/L / AST: 36 U/L / GGT: x               Consultant(s) Notes Reviewed:      Care Discussed with Consultants/Other Providers:  
    SUBJECTIVE / OVERNIGHT EVENTS:pt seen and examined  01-07-22     MEDICATIONS  (STANDING):  amLODIPine   Tablet 5 milliGRAM(s) Oral daily  dexAMETHasone     Tablet 6 milliGRAM(s) Oral daily  enoxaparin Injectable 40 milliGRAM(s) SubCutaneous daily  guaiFENesin  milliGRAM(s) Oral every 12 hours  influenza   Vaccine 0.5 milliLiter(s) IntraMuscular once  remdesivir  IVPB   IV Intermittent   remdesivir  IVPB 100 milliGRAM(s) IV Intermittent every 24 hours    MEDICATIONS  (PRN):  acetaminophen     Tablet .. 650 milliGRAM(s) Oral every 6 hours PRN Temp greater or equal to 38C (100.4F), Mild Pain (1 - 3)  aluminum hydroxide/magnesium hydroxide/simethicone Suspension 30 milliLiter(s) Oral every 4 hours PRN Dyspepsia  melatonin 6 milliGRAM(s) Oral at bedtime PRN Insomnia    Vital Signs Last 24 Hrs  T(C): 36.5 (01-07-22 @ 21:43), Max: 36.5 (01-07-22 @ 21:43)  T(F): 97.7 (01-07-22 @ 21:43), Max: 97.7 (01-07-22 @ 21:43)  HR: 64 (01-07-22 @ 21:43) (64 - 74)  BP: 126/76 (01-07-22 @ 21:43) (126/76 - 135/71)  BP(mean): --  RR: 18 (01-07-22 @ 21:43) (18 - 18)  SpO2: 95% (01-07-22 @ 21:43) (95% - 96%)    Constitutional: No fever, fatigue  Skin: No rash.  Eyes: No recent vision problems or eye pain.  ENT: No congestion, ear pain, or sore throat.  Cardiovascular: No chest pain or palpation.  Respiratory: sob+  Gastrointestinal: No abdominal pain, nausea, vomiting, or diarrhea.  Genitourinary: No dysuria.  Musculoskeletal: No joint swelling.  Neurologic: No headache.    PHYSICAL EXAM:  GENERAL: NAD  EYES: EOMI, PERRLA  NECK: Supple, No JVD  CHEST/LUNG: dec breath sounds at bases  HEART:  S1 , S2 +  ABDOMEN: soft , bs+  EXTREMITIES:  no edema  NEUROLOGY:alert awake    LABS:  01-07    138  |  102  |  23  ----------------------------<  124<H>  4.0   |  23  |  0.64    Ca    9.0      07 Jan 2022 07:15  Phos  3.5     01-07  Mg     2.30     01-07    TPro  6.6  /  Alb  3.9  /  TBili  0.4  /  DBili      /  AST  30  /  ALT  39  /  AlkPhos  73  01-07    Creatinine Trend: 0.64 <--, 0.59 <--, 0.60 <--, 0.74 <--                        15.1   8.31  )-----------( 273      ( 07 Jan 2022 07:15 )             44.5     Urine Studies:            LIVER FUNCTIONS - ( 07 Jan 2022 07:15 )  Alb: 3.9 g/dL / Pro: 6.6 g/dL / ALK PHOS: 73 U/L / ALT: 39 U/L / AST: 30 U/L / GGT: x                 Consultant(s) Notes Reviewed:      Care Discussed with Consultants/Other Providers:  
    SUBJECTIVE / OVERNIGHT EVENTS:pt seen and examined  01-09-22     MEDICATIONS  (STANDING):  amLODIPine   Tablet 5 milliGRAM(s) Oral daily  dexAMETHasone     Tablet 6 milliGRAM(s) Oral daily  enoxaparin Injectable 40 milliGRAM(s) SubCutaneous daily  influenza   Vaccine 0.5 milliLiter(s) IntraMuscular once  polyethylene glycol 3350 17 Gram(s) Oral daily  senna 2 Tablet(s) Oral at bedtime    MEDICATIONS  (PRN):  acetaminophen     Tablet .. 650 milliGRAM(s) Oral every 6 hours PRN Temp greater or equal to 38C (100.4F), Mild Pain (1 - 3)  aluminum hydroxide/magnesium hydroxide/simethicone Suspension 30 milliLiter(s) Oral every 4 hours PRN Dyspepsia  melatonin 6 milliGRAM(s) Oral at bedtime PRN Insomnia    Vital Signs Last 24 Hrs  T(C): 36.9 (01-09-22 @ 09:24), Max: 37.1 (01-09-22 @ 06:50)  T(F): 98.4 (01-09-22 @ 09:24), Max: 98.8 (01-09-22 @ 06:50)  HR: 62 (01-09-22 @ 14:00) (60 - 63)  BP: 120/66 (01-09-22 @ 09:24) (120/66 - 143/71)  BP(mean): --  RR: 18 (01-09-22 @ 14:00) (17 - 18)  SpO2: 93% (01-09-22 @ 14:00) (93% - 97%)      Constitutional: No fever, fatigue  Skin: No rash.  Eyes: No recent vision problems or eye pain.  ENT: No congestion, ear pain, or sore throat.  Cardiovascular: No chest pain or palpation.  Respiratory: sob+  Gastrointestinal: No abdominal pain, nausea, vomiting, or diarrhea.  Genitourinary: No dysuria.  Musculoskeletal: No joint swelling.  Neurologic: No headache.    PHYSICAL EXAM:  GENERAL: NAD  EYES: EOMI, PERRLA  NECK: Supple, No JVD  CHEST/LUNG: dec breath sounds at bases  HEART:  S1 , S2 +  ABDOMEN: soft , bs+  EXTREMITIES:  no edema  NEUROLOGY:alert awake    LABS:  01-09    135  |  100  |  20  ----------------------------<  138<H>  4.1   |  25  |  0.60    Ca    8.7      09 Jan 2022 09:59  Phos  3.0     01-08  Mg     2.30     01-08      Creatinine Trend: 0.60 <--, 0.61 <--, 0.64 <--, 0.59 <--, 0.60 <--, 0.74 <--                        15.7   11.10 )-----------( 281      ( 09 Jan 2022 09:59 )             44.8     Urine Studies:                
    SUBJECTIVE / OVERNIGHT EVENTS:pt seen and examined  01-10-22     MEDICATIONS  (STANDING):  amLODIPine   Tablet 5 milliGRAM(s) Oral daily  dexAMETHasone     Tablet 6 milliGRAM(s) Oral daily  enoxaparin Injectable 40 milliGRAM(s) SubCutaneous daily  influenza   Vaccine 0.5 milliLiter(s) IntraMuscular once  polyethylene glycol 3350 17 Gram(s) Oral daily  senna 2 Tablet(s) Oral at bedtime    MEDICATIONS  (PRN):  acetaminophen     Tablet .. 650 milliGRAM(s) Oral every 6 hours PRN Temp greater or equal to 38C (100.4F), Mild Pain (1 - 3)  aluminum hydroxide/magnesium hydroxide/simethicone Suspension 30 milliLiter(s) Oral every 4 hours PRN Dyspepsia  melatonin 6 milliGRAM(s) Oral at bedtime PRN Insomnia    Vital Signs Last 24 Hrs  T(C): 37.1 (01-10-22 @ 09:14), Max: 37.1 (01-10-22 @ 09:14)  T(F): 98.8 (01-10-22 @ 09:14), Max: 98.8 (01-10-22 @ 09:14)  HR: 60 (01-10-22 @ 09:14) (60 - 73)  BP: 114/76 (01-10-22 @ 09:14) (114/76 - 140/71)  BP(mean): --  RR: 18 (01-10-22 @ 09:14) (18 - 18)  SpO2: 95% (01-10-22 @ 09:14) (94% - 95%)        Constitutional: No fever, fatigue  Skin: No rash.  Eyes: No recent vision problems or eye pain.  ENT: No congestion, ear pain, or sore throat.  Cardiovascular: No chest pain or palpation.  Respiratory: sob+  Gastrointestinal: No abdominal pain, nausea, vomiting, or diarrhea.  Genitourinary: No dysuria.  Musculoskeletal: No joint swelling.  Neurologic: No headache.    PHYSICAL EXAM:  GENERAL: NAD  EYES: EOMI, PERRLA  NECK: Supple, No JVD  CHEST/LUNG: dec breath sounds at bases  HEART:  S1 , S2 +  ABDOMEN: soft , bs+  EXTREMITIES:  no edema  NEUROLOGY:alert awake    LABS:  01-10    133<L>  |  99  |  23  ----------------------------<  129<H>  4.4   |  21<L>  |  0.66    Ca    9.0      10 Jesus 2022 08:13      Creatinine Trend: 0.66 <--, 0.60 <--, 0.61 <--, 0.64 <--, 0.59 <--, 0.60 <--, 0.74 <--                        15.6   10.85 )-----------( 297      ( 10 Jesus 2022 08:13 )             44.4     Urine Studies:

## 2022-01-10 NOTE — PROGRESS NOTE ADULT - PROVIDER SPECIALTY LIST ADULT
Infectious Disease
Internal Medicine
Pulmonology
Pulmonology
Infectious Disease
Internal Medicine

## 2022-01-10 NOTE — DISCHARGE NOTE NURSING/CASE MANAGEMENT/SOCIAL WORK - NSDCPEFALRISK_GEN_ALL_CORE
For information on Fall & Injury Prevention, visit: [email or web address] OR  [email or web address] OR  https://www.cdc.gov/steadi/patient.html

## 2022-01-11 ENCOUNTER — NON-APPOINTMENT (OUTPATIENT)
Age: 62
End: 2022-01-11

## 2022-01-29 PROBLEM — U07.1 ACUTE RESPIRATORY FAILURE DUE TO COVID-19: Status: ACTIVE | Noted: 2022-01-29

## 2022-01-29 NOTE — PHYSICAL EXAM
[Normal] : normal rate, regular rhythm, normal S1 and S2 and no murmur heard [Soft] : abdomen soft [Non Tender] : non-tender

## 2022-02-02 ENCOUNTER — APPOINTMENT (OUTPATIENT)
Dept: FAMILY MEDICINE | Facility: CLINIC | Age: 62
End: 2022-02-02
Payer: COMMERCIAL

## 2022-02-02 VITALS
TEMPERATURE: 97.1 F | HEIGHT: 66 IN | HEART RATE: 89 BPM | DIASTOLIC BLOOD PRESSURE: 88 MMHG | WEIGHT: 154 LBS | BODY MASS INDEX: 24.75 KG/M2 | SYSTOLIC BLOOD PRESSURE: 142 MMHG | OXYGEN SATURATION: 98 %

## 2022-02-02 VITALS — SYSTOLIC BLOOD PRESSURE: 132 MMHG | DIASTOLIC BLOOD PRESSURE: 80 MMHG

## 2022-02-02 DIAGNOSIS — U07.1 COVID-19: ICD-10-CM

## 2022-02-02 DIAGNOSIS — J96.00 COVID-19: ICD-10-CM

## 2022-02-02 PROCEDURE — 99214 OFFICE O/P EST MOD 30 MIN: CPT

## 2022-02-02 RX ORDER — PREDNISONE 10 MG/1
10 TABLET ORAL
Qty: 30 | Refills: 0 | Status: DISCONTINUED | COMMUNITY
Start: 2020-06-30 | End: 2022-02-02

## 2022-02-02 RX ORDER — ROSUVASTATIN CALCIUM 10 MG/1
10 TABLET, FILM COATED ORAL DAILY
Refills: 0 | Status: DISCONTINUED | COMMUNITY
End: 2022-02-02

## 2022-02-02 NOTE — PLAN
[FreeTextEntry1] : He is overdue for a physical and will schedule this in 3 months as his next visit.

## 2022-02-02 NOTE — HISTORY OF PRESENT ILLNESS
[FreeTextEntry1] : CONY VAZQUEZ is a 61 year old male here for a follow up visit.  [de-identified] : He is here for hospital follow up. He was admitted to [facility] with COVID pneumonia (he was not vaccinated) on 1/4/22 and was discharged on 1/10/22. He was treated with dexamethasone and Remdesivir. He had elevated inflammatory markers and was treated with Lovenox but was not discharged with any anticoagulation. \par \par His blood pressure was elevated as well. He was already taking amlodipine and was advised to follow up with his PMD and cardiologist after discharge to check his blood pressure. He saw his cardiologist (Dr. Waters) on Monday and his cholesterol was elevated. He had a prescription for Rosuvastatin but was not taking it. Dr. Waters increased this from 10 to 20 mg and he just started the 20 mg dose.

## 2022-02-02 NOTE — ASSESSMENT
[FreeTextEntry1] : His exam is unremarkable today. His initial blood pressure was elevated but he states that he has white coat hypertension. \par \par He just started Crestor for elevated cholesterol. He will need to repeat his labs in about 3 months.\par \par He is not vaccinated for COVID and had his antibody level checked on Monday. These results are pending. He plans to repeat his antibody level in 3 months with his cholesterol before deciding about getting the vaccine.

## 2022-06-15 ENCOUNTER — APPOINTMENT (OUTPATIENT)
Dept: FAMILY MEDICINE | Facility: CLINIC | Age: 62
End: 2022-06-15
Payer: COMMERCIAL

## 2022-06-15 VITALS
HEIGHT: 66 IN | TEMPERATURE: 97.7 F | DIASTOLIC BLOOD PRESSURE: 84 MMHG | BODY MASS INDEX: 24.59 KG/M2 | SYSTOLIC BLOOD PRESSURE: 134 MMHG | HEART RATE: 78 BPM | OXYGEN SATURATION: 98 % | WEIGHT: 153 LBS

## 2022-06-15 PROCEDURE — 36415 COLL VENOUS BLD VENIPUNCTURE: CPT

## 2022-06-15 PROCEDURE — 99396 PREV VISIT EST AGE 40-64: CPT | Mod: 25

## 2022-06-15 RX ORDER — DOXAZOSIN 4 MG/1
4 TABLET ORAL
Refills: 0 | Status: DISCONTINUED | COMMUNITY
End: 2022-06-15

## 2022-06-15 NOTE — ASSESSMENT
[FreeTextEntry1] : CONY VAZQUEZ is a 61 year old male here for a physical exam. \par \par He was hospitalized with COVID pneumonia in 1/2022. His blood pressure was elevated at that time and he was advised to follow up with his Dr. Waters, his cardiologist. Dr. Waters increased his Rosuvastatin from 10 to 20 mg due to his lab results. His labs also revealed a HgbA1c of 6.3%.\par \par He sees a cardiologist regularly and does not need an EKG today. \par

## 2022-06-15 NOTE — PLAN
[FreeTextEntry1] : Continue all medications as prescribed. Check labs as above. Will adjust any medications based upon lab results.\par \par Reviewed age-appropriate preventive screening tests with patient. He is due for Tdap and Shingrix. He declines all vaccines. He is due for colon cancer screening and agreed to a referral today.\par \par Discussed clean eating (e.g. Mediterranean style diet) and recommendations for regular exercise/staying as physically active as possible.\par \par Reviewed importance of good self care (e.g. meditation, yoga, adequate rest, regular exercise, magnesium, clean eating, etc.).\par \par Follow up for next physical in one year.

## 2022-06-15 NOTE — HISTORY OF PRESENT ILLNESS
[FreeTextEntry1] : CONY VAZQUEZ is a 61 year old male here for a physical exam.  [de-identified] : His last physical exam was 8/2020\par \par Vaccines:\par Tetanus is NOT up to date\par Shingrix is NOT up to date\par COVID vaccine is NOT up to date\par \par His last dentist visit was less than one year ago\par His last eye doctor appointment was less than one year ago\par His last dermatologist visit was several years ago\par \par Colon cancer screening is NOT up to date\par \par His diet is healthy overall\par Exercise: walking

## 2022-06-16 ENCOUNTER — TRANSCRIPTION ENCOUNTER (OUTPATIENT)
Age: 62
End: 2022-06-16

## 2022-06-16 LAB
ALBUMIN SERPL ELPH-MCNC: 5.2 G/DL
ALP BLD-CCNC: 76 U/L
ALT SERPL-CCNC: 25 U/L
ANION GAP SERPL CALC-SCNC: 16 MMOL/L
AST SERPL-CCNC: 20 U/L
BILIRUB SERPL-MCNC: 0.6 MG/DL
BUN SERPL-MCNC: 15 MG/DL
CALCIUM SERPL-MCNC: 10 MG/DL
CHLORIDE SERPL-SCNC: 104 MMOL/L
CHOLEST SERPL-MCNC: 154 MG/DL
CO2 SERPL-SCNC: 22 MMOL/L
CREAT SERPL-MCNC: 0.73 MG/DL
EGFR: 102 ML/MIN/1.73M2
ESTIMATED AVERAGE GLUCOSE: 126 MG/DL
GLUCOSE SERPL-MCNC: 89 MG/DL
HBA1C MFR BLD HPLC: 6 %
HDLC SERPL-MCNC: 42 MG/DL
LDLC SERPL CALC-MCNC: 80 MG/DL
NONHDLC SERPL-MCNC: 112 MG/DL
POTASSIUM SERPL-SCNC: 3.9 MMOL/L
PROT SERPL-MCNC: 7.3 G/DL
PSA SERPL-MCNC: 8.86 NG/ML
SODIUM SERPL-SCNC: 142 MMOL/L
TRIGL SERPL-MCNC: 158 MG/DL

## 2022-06-20 ENCOUNTER — TRANSCRIPTION ENCOUNTER (OUTPATIENT)
Age: 62
End: 2022-06-20

## 2022-06-23 ENCOUNTER — NON-APPOINTMENT (OUTPATIENT)
Age: 62
End: 2022-06-23

## 2022-06-28 ENCOUNTER — NON-APPOINTMENT (OUTPATIENT)
Age: 62
End: 2022-06-28

## 2022-09-23 NOTE — ED PROVIDER NOTE - CPE EDP SKIN NORM
Weight Units: pounds Show Text Field For Brand Names Of Contraception?: Yes Retinoid Dermatitis Aggressive Treatment: I recommended more frequent application of Cetaphil or CeraVe to the areas of dermatitis. I also prescribed a topical steroid for twice daily use until the dermatitis resolves. Male Completion Statement: After discussing his treatment course we decided to discontinue isotretinoin therapy at this time. He shouldn't donate blood for one month after the last dose. He should call with any new symptoms of depression. Use Enhanced Counseling Feature (Automatic): No Hypercholesterolemia Monitoring: I explained this is common when taking isotretinoin. We will monitor closely. Months Of Therapy Completed: 2 Cheilitis Normal Treatment: I recommended application of Vaseline or Aquaphor numerous times a day (as often as every hour) and before going to bed. Cheilitis Aggressive Treatment: I recommended application of Vaseline or Aquaphor numerous times a day (as often as every hour) and before going to bed. I also prescribed a topical steroid for twice daily use. Any Nosebleeds?: Yes - Normal Treatment Dosing Month 1 (Required For Cumulative Dosing): 40mg BID Xerosis Normal Treatment: I recommended application of Cetaphil or CeraVe numerous times a day going to bed to all dry areas. Myalgia Monitoring: I explained this is common when taking isotretinoin. If this worsens they will contact us. Retinoid Dermatitis Normal Treatment: I recommended more frequent application of Cetaphil or CeraVe to the areas of dermatitis. Xerosis Aggressive Treatment: I recommended application of Cetaphil or CeraVe numerous times a day going to bed to all dry areas. I also prescribed a topical steroid for twice daily use. Detail Level: Zone Headache Monitoring: I recommended monitoring the headaches for now. There is no evidence of increased intracranial pressure. They were instructed to call if the headaches are worsening. normal... Counseling Text: I reviewed the side effect in detail. Patient should get monthly blood tests, not donate blood, not drive at night if vision affected, and not share medication. Myalgia Treatment: I explained this is common when taking isotretinoin. If this worsens they will contact us. They may try OTC ibuprofen. Use Therapeutic Ranged Or Therapeutic Target: please select Range or Target What Is The Patient's Gender: Male Nosebleeds Normal Treatment: I explained this is common when taking isotretinoin. I recommended saline mist in each nostril multiple times a day. If this worsens they will contact us. Female Pregnancy Counseling Text: Female patients should also be on two forms of birth control while taking this medication and for one month after their last dose. Next Month's Dosage: Continue Current Dosage Lower Range (In Mg/Kg): 120 Any Headache: Yes - Monitoring Upper Range (In Mg/Kg): 150 Are Labs Available For Review?: No- Not Drawn Yet Pounds Preamble Statement (Weight Entered In Details Tab): Reported Weight in pounds: Target Cumulative Dosage (In Mg/Kg): 135 Kilograms Preamble Statement (Weight Entered In Details Tab): Reported Weight in kilograms: Xerosis Normal Treatment: I recommended application of Cetaphil or CeraVe numerous times a day and before going to bed to all dry areas. Female Completion Statement: After discussing her treatment course we decided to discontinue isotretinoin therapy at this time. I explained that she would need to continue her birth control methods for at least one month after the last dosage. She should also get a pregnancy test one month after the last dose. She shouldn't donate blood for one month after the last dose. She should call with any new symptoms of depression. Xerosis Aggressive Treatment: I recommended application of Cetaphil or CeraVe numerous times a day and before going to bed to all dry areas. I also prescribed a topical steroid for twice daily use. Patient Weight (Optional But Required For Cumulative Dose-Numbers And Decimals Only): 175

## 2022-11-11 NOTE — PRE-OP CHECKLIST - ASSESSMENT, HISTORY & PHYSICAL COMPLETED AND ON MEDICAL RECORD
Physical Therapy Visit    Visit Type: Daily Treatment Note  Visit: 8  Referring Provider: Jose Cruz Carreon MD  Medical Diagnosis (from order): Diagnosis Information    Diagnosis  726.72 (ICD-9-CM) - M76.821 (ICD-10-CM) - Tibialis posterior tendinitis, right         SUBJECTIVE                                                                                                               Pt states that he is doing much better. He reports much less pain and HEP is going well.       OBJECTIVE                                                                                                                                       Treatment     Therapeutic Exercise  Nu step x 5 minutes  Standing gastroc stretch with forefoot on 1/2 foam roll  Ankle DF/PF on wobble board  Standing med/lat weight shifting on wobble board  Standing hip abduction green theraband  1x10 bilaterally  Standing hip extension green theraband  1x10 bilaterally  Tandem balance with intermittent UE support    Manual Therapy   Soft tissue mobilization to the R posterior tib, medial gastroc and soleus  Cross friction mobs to the R posterior tib tendon  Mid foot mobs, grade 2-3  Talocrural joint mobs, grade 2-3    Skilled input: verbal instruction/cues    Writer verbally educated and received verbal consent for hand placement, positioning of patient, and techniques to be performed today from patient for clothing adjustments for techniques and hand placement and palpation for techniques as described above and how they are pertinent to the patient's plan of care.    Home Exercise Program  Ankle pumps  Ankle inv/ever  Ankle circles  Standing gastroc stretch  Long sitting ankle DF and PF with green theraband   Long sitting ankle EV with red theraband   Seated inversion eccentric strengthening  Standing MTP extension      ASSESSMENT                                                                                                            Pt has decreased pain over  the last week. Tightness is reduced along the posterior tibialis. Mild swelling noted posterior to medial malleolus. Pt tolerates progression in strengthening and balance training. He is instructed in beginning with modified tandem stance at home and progressing toward narrow stance.   Education:   - Results of above outlined education: Verbalizes understanding    PLAN                                                                                                                           Suggestions for next session as indicated: Progress per plan of care. Progress strengthening and balance        Therapy procedure time and total treatment time can be found documented on the Time Entry flowsheet     done

## 2022-11-15 NOTE — ED ADULT NURSE NOTE - NS ED NURSE LEVEL OF CONSCIOUSNESS ORIENTATION
Continue Vigamox- 1 drop left eye 4 x day for 2 days and discontinue.    Recommend artificial tears 1 drop left eye 3 x day and gel at night.    Return in 2 weeks for recheck or sooner if needed.    Abdoulaye Quintana, OD     Oriented - self; Oriented - place; Oriented - time

## 2022-12-03 RX ORDER — AMLODIPINE BESYLATE 5 MG/1
5 TABLET ORAL
Refills: 0 | Status: ACTIVE | COMMUNITY

## 2022-12-03 RX ORDER — ROSUVASTATIN CALCIUM 20 MG/1
20 TABLET, FILM COATED ORAL
Refills: 0 | Status: ACTIVE | COMMUNITY

## 2022-12-08 ENCOUNTER — APPOINTMENT (OUTPATIENT)
Dept: FAMILY MEDICINE | Facility: CLINIC | Age: 62
End: 2022-12-08

## 2022-12-11 ENCOUNTER — FORM ENCOUNTER (OUTPATIENT)
Age: 62
End: 2022-12-11

## 2023-02-20 NOTE — ED PROVIDER NOTE - CLINICAL SUMMARY MEDICAL DECISION MAKING FREE TEXT BOX
ctap to eval for hernia. labs. Symptomatic treatment. no testicular sxs. poss appy but less likely. Home

## 2023-04-03 NOTE — H&P PST ADULT - NSSUBSTANCEUSE_GEN_ALL_CORE_SD
Mom amanda is calling back. She stated she has gone to several [facility] and some [facility] pharmacies and everyone seems to be out of the magnesium citrate. She is aware that Dr Alcala is not in yet anymore this afternoon and wanted to know if maybe the nurse is able to call her back and see what else she can do or take? She is asking for a call back asap   Former smoker never used

## 2023-09-20 ENCOUNTER — NON-APPOINTMENT (OUTPATIENT)
Age: 63
End: 2023-09-20

## 2023-09-20 ENCOUNTER — APPOINTMENT (OUTPATIENT)
Dept: FAMILY MEDICINE | Facility: CLINIC | Age: 63
End: 2023-09-20
Payer: COMMERCIAL

## 2023-09-20 VITALS
TEMPERATURE: 97.7 F | OXYGEN SATURATION: 99 % | SYSTOLIC BLOOD PRESSURE: 130 MMHG | DIASTOLIC BLOOD PRESSURE: 90 MMHG | WEIGHT: 154 LBS | HEART RATE: 68 BPM | BODY MASS INDEX: 24.75 KG/M2 | HEIGHT: 66 IN

## 2023-09-20 VITALS — DIASTOLIC BLOOD PRESSURE: 80 MMHG | SYSTOLIC BLOOD PRESSURE: 132 MMHG

## 2023-09-20 DIAGNOSIS — H26.9 UNSPECIFIED CATARACT: ICD-10-CM

## 2023-09-20 DIAGNOSIS — N40.0 BENIGN PROSTATIC HYPERPLASIA WITHOUT LOWER URINARY TRACT SYMPMS: ICD-10-CM

## 2023-09-20 DIAGNOSIS — Z01.818 ENCOUNTER FOR OTHER PREPROCEDURAL EXAMINATION: ICD-10-CM

## 2023-09-20 DIAGNOSIS — Z00.00 ENCOUNTER FOR GENERAL ADULT MEDICAL EXAMINATION W/OUT ABNORMAL FINDINGS: ICD-10-CM

## 2023-09-20 DIAGNOSIS — E78.5 HYPERLIPIDEMIA, UNSPECIFIED: ICD-10-CM

## 2023-09-20 DIAGNOSIS — I10 ESSENTIAL (PRIMARY) HYPERTENSION: ICD-10-CM

## 2023-09-20 PROCEDURE — 93000 ELECTROCARDIOGRAM COMPLETE: CPT | Mod: 59

## 2023-09-20 PROCEDURE — 36415 COLL VENOUS BLD VENIPUNCTURE: CPT

## 2023-09-20 PROCEDURE — 99396 PREV VISIT EST AGE 40-64: CPT | Mod: 25

## 2023-09-23 ENCOUNTER — TRANSCRIPTION ENCOUNTER (OUTPATIENT)
Age: 63
End: 2023-09-23

## 2023-09-23 LAB
ALBUMIN SERPL ELPH-MCNC: 5.2 G/DL
ALP BLD-CCNC: 86 U/L
ALT SERPL-CCNC: 21 U/L
ANION GAP SERPL CALC-SCNC: 17 MMOL/L
AST SERPL-CCNC: 20 U/L
BASOPHILS # BLD AUTO: 0.02 K/UL
BASOPHILS NFR BLD AUTO: 0.3 %
BILIRUB SERPL-MCNC: 0.5 MG/DL
BUN SERPL-MCNC: 14 MG/DL
CALCIUM SERPL-MCNC: 9.6 MG/DL
CHLORIDE SERPL-SCNC: 103 MMOL/L
CHOLEST SERPL-MCNC: 189 MG/DL
CO2 SERPL-SCNC: 23 MMOL/L
CREAT SERPL-MCNC: 0.81 MG/DL
EGFR: 98 ML/MIN/1.73M2
EOSINOPHIL # BLD AUTO: 0.08 K/UL
EOSINOPHIL NFR BLD AUTO: 1.3 %
ESTIMATED AVERAGE GLUCOSE: 120 MG/DL
GLUCOSE SERPL-MCNC: 99 MG/DL
HBA1C MFR BLD HPLC: 5.8 %
HCT VFR BLD CALC: 48.4 %
HDLC SERPL-MCNC: 42 MG/DL
HGB BLD-MCNC: 15.7 G/DL
IMM GRANULOCYTES NFR BLD AUTO: 0.2 %
LDLC SERPL CALC-MCNC: 112 MG/DL
LYMPHOCYTES # BLD AUTO: 1.46 K/UL
LYMPHOCYTES NFR BLD AUTO: 24.3 %
MAN DIFF?: NORMAL
MCHC RBC-ENTMCNC: 32.4 GM/DL
MCHC RBC-ENTMCNC: 32.7 PG
MCV RBC AUTO: 100.8 FL
MONOCYTES # BLD AUTO: 0.42 K/UL
MONOCYTES NFR BLD AUTO: 7 %
NEUTROPHILS # BLD AUTO: 4.03 K/UL
NEUTROPHILS NFR BLD AUTO: 66.9 %
NONHDLC SERPL-MCNC: 146 MG/DL
PLATELET # BLD AUTO: 167 K/UL
POTASSIUM SERPL-SCNC: 4.3 MMOL/L
PROT SERPL-MCNC: 7.4 G/DL
PSA SERPL-MCNC: 9.43 NG/ML
RBC # BLD: 4.8 M/UL
RBC # FLD: 13.2 %
SODIUM SERPL-SCNC: 143 MMOL/L
TRIGL SERPL-MCNC: 195 MG/DL
WBC # FLD AUTO: 6.02 K/UL

## 2024-03-13 NOTE — ED ADULT NURSE NOTE - CAS EDN DISCHARGE ASSESSMENT
Scheduled EGD/Colon for 4/26/24 at [facility] with Dr Coombs, scheduled procedure with pts mother Rafael. Prep sent to pharmacy. Instructions mailed to tps home address. Pts mom aware pt needs a  for this procedure    Alert and oriented to person, place and time

## 2024-06-09 NOTE — ED ADULT TRIAGE NOTE - INTERNATIONAL TRAVEL
Patient arrived to ICU room 286 around 2345. Patient Aox3, on 2L NC upon arrival. Tachycardic and tachypneic as well. Oriented to room and call light. Informed of valuables policy. See care plan   No
